# Patient Record
Sex: FEMALE | Race: WHITE | Employment: FULL TIME | URBAN - NONMETROPOLITAN AREA
[De-identification: names, ages, dates, MRNs, and addresses within clinical notes are randomized per-mention and may not be internally consistent; named-entity substitution may affect disease eponyms.]

---

## 2022-03-31 PROBLEM — F33.1 MAJOR DEPRESSIVE DISORDER, RECURRENT EPISODE, MODERATE WITH MIXED FEATURES (HCC): Status: ACTIVE | Noted: 2020-11-24

## 2022-03-31 PROBLEM — R79.89 HIGH SERUM TESTOSTERONE: Status: ACTIVE | Noted: 2018-12-18

## 2023-08-16 DIAGNOSIS — Z01.818 PREOP EXAMINATION: Primary | ICD-10-CM

## 2023-09-21 ENCOUNTER — PREP FOR PROCEDURE (OUTPATIENT)
Dept: BARIATRICS/WEIGHT MGMT | Age: 23
End: 2023-09-21

## 2023-09-21 RX ORDER — SCOLOPAMINE TRANSDERMAL SYSTEM 1 MG/1
1 PATCH, EXTENDED RELEASE TRANSDERMAL
Status: CANCELLED | OUTPATIENT
Start: 2023-10-02 | End: 2023-10-05

## 2023-09-21 RX ORDER — FAMOTIDINE 10 MG/ML
20 INJECTION, SOLUTION INTRAVENOUS ONCE
Status: CANCELLED | OUTPATIENT
Start: 2023-10-02 | End: 2023-10-02

## 2023-09-21 RX ORDER — SODIUM CHLORIDE 9 MG/ML
INJECTION, SOLUTION INTRAVENOUS PRN
Status: CANCELLED | OUTPATIENT
Start: 2023-10-02

## 2023-09-21 RX ORDER — SODIUM CHLORIDE 0.9 % (FLUSH) 0.9 %
5-40 SYRINGE (ML) INJECTION PRN
Status: CANCELLED | OUTPATIENT
Start: 2023-10-02

## 2023-09-21 RX ORDER — ONDANSETRON 2 MG/ML
4 INJECTION INTRAMUSCULAR; INTRAVENOUS ONCE
Status: CANCELLED | OUTPATIENT
Start: 2023-10-02 | End: 2023-10-02

## 2023-09-21 RX ORDER — SODIUM CHLORIDE 0.9 % (FLUSH) 0.9 %
5-40 SYRINGE (ML) INJECTION EVERY 12 HOURS SCHEDULED
Status: CANCELLED | OUTPATIENT
Start: 2023-10-02

## 2023-09-21 RX ORDER — DEXAMETHASONE SODIUM PHOSPHATE 10 MG/ML
10 INJECTION, SOLUTION INTRAMUSCULAR; INTRAVENOUS ONCE
Status: CANCELLED | OUTPATIENT
Start: 2023-10-02 | End: 2023-10-02

## 2023-09-22 ENCOUNTER — TELEMEDICINE (OUTPATIENT)
Dept: PSYCHIATRY | Age: 23
End: 2023-09-22
Payer: COMMERCIAL

## 2023-09-22 DIAGNOSIS — G47.00 INSOMNIA, UNSPECIFIED TYPE: ICD-10-CM

## 2023-09-22 DIAGNOSIS — F41.9 ANXIETY: ICD-10-CM

## 2023-09-22 DIAGNOSIS — F90.2 ATTENTION DEFICIT HYPERACTIVITY DISORDER (ADHD), COMBINED TYPE: Primary | ICD-10-CM

## 2023-09-22 DIAGNOSIS — F43.0 ACUTE STRESS REACTION: ICD-10-CM

## 2023-09-22 PROCEDURE — G8427 DOCREV CUR MEDS BY ELIG CLIN: HCPCS | Performed by: NURSE PRACTITIONER

## 2023-09-22 PROCEDURE — G8417 CALC BMI ABV UP PARAM F/U: HCPCS | Performed by: NURSE PRACTITIONER

## 2023-09-22 PROCEDURE — 1036F TOBACCO NON-USER: CPT | Performed by: NURSE PRACTITIONER

## 2023-09-22 PROCEDURE — 99214 OFFICE O/P EST MOD 30 MIN: CPT | Performed by: NURSE PRACTITIONER

## 2023-09-22 PROCEDURE — 90833 PSYTX W PT W E/M 30 MIN: CPT | Performed by: NURSE PRACTITIONER

## 2023-09-22 RX ORDER — LISDEXAMFETAMINE DIMESYLATE CAPSULES 60 MG/1
60 CAPSULE ORAL DAILY
Qty: 30 CAPSULE | Refills: 0 | Status: SHIPPED | OUTPATIENT
Start: 2023-09-22 | End: 2023-09-26 | Stop reason: SDUPTHER

## 2023-09-22 ASSESSMENT — ANXIETY QUESTIONNAIRES
4. TROUBLE RELAXING: SEVERAL DAYS
IF YOU CHECKED OFF ANY PROBLEMS ON THIS QUESTIONNAIRE, HOW DIFFICULT HAVE THESE PROBLEMS MADE IT FOR YOU TO DO YOUR WORK, TAKE CARE OF THINGS AT HOME, OR GET ALONG WITH OTHER PEOPLE: SOMEWHAT DIFFICULT
6. BECOMING EASILY ANNOYED OR IRRITABLE: 2
2. NOT BEING ABLE TO STOP OR CONTROL WORRYING: NOT AT ALL
3. WORRYING TOO MUCH ABOUT DIFFERENT THINGS: 1
6. BECOMING EASILY ANNOYED OR IRRITABLE: MORE THAN HALF THE DAYS
IF YOU CHECKED OFF ANY PROBLEMS ON THIS QUESTIONNAIRE, HOW DIFFICULT HAVE THESE PROBLEMS MADE IT FOR YOU TO DO YOUR WORK, TAKE CARE OF THINGS AT HOME, OR GET ALONG WITH OTHER PEOPLE: SOMEWHAT DIFFICULT
4. TROUBLE RELAXING: 1
7. FEELING AFRAID AS IF SOMETHING AWFUL MIGHT HAPPEN: SEVERAL DAYS
3. WORRYING TOO MUCH ABOUT DIFFERENT THINGS: SEVERAL DAYS
1. FEELING NERVOUS, ANXIOUS, OR ON EDGE: SEVERAL DAYS
7. FEELING AFRAID AS IF SOMETHING AWFUL MIGHT HAPPEN: 1
5. BEING SO RESTLESS THAT IT IS HARD TO SIT STILL: NOT AT ALL
5. BEING SO RESTLESS THAT IT IS HARD TO SIT STILL: 0
2. NOT BEING ABLE TO STOP OR CONTROL WORRYING: 0
1. FEELING NERVOUS, ANXIOUS, OR ON EDGE: 1
GAD7 TOTAL SCORE: 6

## 2023-09-22 ASSESSMENT — PATIENT HEALTH QUESTIONNAIRE - PHQ9
SUM OF ALL RESPONSES TO PHQ QUESTIONS 1-9: 1
SUM OF ALL RESPONSES TO PHQ9 QUESTIONS 1 & 2: 1
SUM OF ALL RESPONSES TO PHQ QUESTIONS 1-9: 1
SUM OF ALL RESPONSES TO PHQ QUESTIONS 1-9: 1
1. LITTLE INTEREST OR PLEASURE IN DOING THINGS: 0
SUM OF ALL RESPONSES TO PHQ QUESTIONS 1-9: 1
1. LITTLE INTEREST OR PLEASURE IN DOING THINGS: NOT AT ALL
2. FEELING DOWN, DEPRESSED OR HOPELESS: 1
SUM OF ALL RESPONSES TO PHQ9 QUESTIONS 1 & 2: 1
2. FEELING DOWN, DEPRESSED OR HOPELESS: SEVERAL DAYS

## 2023-09-22 NOTE — PROGRESS NOTES
1. Attention deficit hyperactivity disorder (ADHD), combined type    2. Anxiety    3. Insomnia, unspecified type    4. Acute stress reaction        R/O BPD    PLAN   Follow-up:  Return in about 4 weeks (around 10/20/2023), or if symptoms worsen or fail to improve, for follow-up and medication management. Prescriptions for this encounter:  New Prescriptions    No medications on file       Orders Placed This Encounter   Medications    DISCONTD: lisdexamfetamine 60 MG CAPS     Sig: Take 60 mg by mouth daily for 30 days. Max Daily Amount: 60 mg     Dispense:  30 capsule     Refill:  0       Medications Discontinued During This Encounter   Medication Reason    Prenatal MV & Min w/FA-DHA (PRENATAL ADULT GUMMY/DHA/FA PO) Patient Choice    lisdexamfetamine (VYVANSE) 50 MG capsule        Additional orders:  No orders of the defined types were placed in this encounter. Patient is reporting she doesn't find current dose of Vyvanse beneficial. She is still reporting problems with focus/cocnentration and symptoms of ADD/ADHD. She will increase to Vyvanse 60mg daily. She is having significant stressors. Supportive therapy provided. Patient is encouraged to utilize nonpharmacologic coping skills such as deep breathing, guided imagery, guided meditation, muscle relaxation, calming music, and/or journaling. Risks, potential side effects, possible drug-drug interactions, benefits and alternate treatments discussed in detail. All questions answered. Patient stated understanding and is agreeable to treatment plan. Patient has been instructed to seek emergency help via the emergency and/or calling 911 should symptoms become severe, worsen, or with other concerning symptoms. Patient instructed to go immediately to the emergency room and/or call 911 with any suicidal or homicidal ideations or if audio/visual hallucinations develop. Patient stated understanding and agrees. Patient given crisis center information.     I

## 2023-10-02 PROBLEM — E66.01 MORBID OBESITY (HCC): Status: ACTIVE | Noted: 2023-10-02

## 2023-10-02 PROBLEM — E66.01 CLASS 3 SEVERE OBESITY DUE TO EXCESS CALORIES WITH BODY MASS INDEX (BMI) OF 45.0 TO 49.9 IN ADULT, UNSPECIFIED WHETHER SERIOUS COMORBIDITY PRESENT (HCC): Status: ACTIVE | Noted: 2023-10-02

## 2023-10-02 PROBLEM — E11.9 TYPE 2 DIABETES MELLITUS WITHOUT COMPLICATION (HCC): Status: ACTIVE | Noted: 2023-10-02

## 2023-10-02 PROBLEM — E66.813 CLASS 3 SEVERE OBESITY DUE TO EXCESS CALORIES WITH BODY MASS INDEX (BMI) OF 45.0 TO 49.9 IN ADULT, UNSPECIFIED WHETHER SERIOUS COMORBIDITY PRESENT: Status: ACTIVE | Noted: 2023-10-02

## 2023-10-09 ENCOUNTER — OFFICE VISIT (OUTPATIENT)
Dept: BARIATRICS/WEIGHT MGMT | Age: 23
End: 2023-10-09

## 2023-10-09 VITALS
HEART RATE: 84 BPM | HEIGHT: 70 IN | WEIGHT: 242.2 LBS | SYSTOLIC BLOOD PRESSURE: 124 MMHG | DIASTOLIC BLOOD PRESSURE: 70 MMHG | BODY MASS INDEX: 34.67 KG/M2 | TEMPERATURE: 97.9 F

## 2023-10-09 DIAGNOSIS — F32.A DEPRESSION, UNSPECIFIED DEPRESSION TYPE: ICD-10-CM

## 2023-10-09 DIAGNOSIS — G47.33 OSA ON CPAP: ICD-10-CM

## 2023-10-09 DIAGNOSIS — F43.10 PTSD (POST-TRAUMATIC STRESS DISORDER): ICD-10-CM

## 2023-10-09 DIAGNOSIS — F41.1 GENERALIZED ANXIETY DISORDER: ICD-10-CM

## 2023-10-09 DIAGNOSIS — Z98.84 HISTORY OF ROUX-EN-Y GASTRIC BYPASS: Primary | ICD-10-CM

## 2023-10-09 PROCEDURE — 99024 POSTOP FOLLOW-UP VISIT: CPT | Performed by: PHYSICIAN ASSISTANT

## 2023-10-09 NOTE — PATIENT INSTRUCTIONS
Stay well hydrated. Drink a minimum of 64 oz of non-carbonated, non-caffeinated fluids daily. Nutritional education occurred during visit. Tolerating diet. Continue following  with dietitian and follow their recommendations as  directed. Continue  60-80  grams of protein each day. Signs and symptoms reviewed with patient that would be concerning and need her to return to office for re-evaluation. Patient will call if any questions or  concerns arrise. No lifting, pushing, pulling over 20#  No abdominal exercises  Wear abdominal binder prn  Complete Lovenox as rx  Importance of physical activity discussed with patient. Advised to increase activity as tolerated. Continue taking Multivitamin, Calcium and B12 as directed  Continue Omeprazole for at least 3 months post-op. Encouraged to attend support groups  May start soft foods over weekend (starting 10/14/23)  AFTER drinking 2 protein shakes- yogurt, cottage cheese, mashed potatoes, applesauce ( Max of 2 Tablespoons)  Follow-up in 1 week with provider and dietitian  Wear CPAP QHS- adjust pressure with pulmonary as needed.

## 2023-10-12 ENCOUNTER — CARE COORDINATION (OUTPATIENT)
Dept: CASE MANAGEMENT | Age: 23
End: 2023-10-12

## 2023-10-12 NOTE — CARE COORDINATION
Care Transitions Follow Up Call    Patient Current Location:  Home: [address] [facility]    Care Transition Nurse contacted the patient by telephone to follow up after admission on 10/2/23. Verified name and  with patient as identifiers. Patient: [place]  Patient : 2000   MRN: <[id number]>  Reason for Admission: s/p gastric bypass  Discharge Date: 10/3/23 RARS: Readmission Risk Score: 4.4      Needs to be reviewed by the provider   Additional needs identified to be addressed with provider: Yes  none             Method of communication with provider: none. Patient states she is doing pretty good. States she is up and moving around. Patient states she was seen by PA 10/9/23 for a post op appointment. She states per PA incision looks good. Denies redness, drainage, fever, chills, abdominal cramping, n/v. Patient states she is no longer taking pain or nausea medication. Elimination is regular currently. Patient states it had been 5 day since a BM. States she took Magnesium of Citrate 10/6/23. Patient had a question if normal to experience a numbness well below incision and starting her menstrual cycle 2 weeks earlier. Patient informed body is going through changes with weight fluctuation. Patient encouraged to discuss with her OB/Gyn and NP when she returns for a post-op appointment on 10/16. Patient is tolerating a diet of protein shakes, water with Crystal Light, jello and broth. She states she was approved to move forward with soft foods earlier than anticipated. Patient states she has to consult her pulmonologist to adjust readings for her C-PAP because she thinks it is to much pressure. Patient is playing phone tag with [employer]. They called her. She returned the call. She is not waiting for a return call. Patient encouraged to call them back. No other questions or concerns.     Addressed changes since last contact:   Incisional pain status, diet tolerating soft foods, questions

## 2023-10-13 ENCOUNTER — OFFICE VISIT (OUTPATIENT)
Dept: PSYCHOLOGY | Age: 23
End: 2023-10-13
Payer: COMMERCIAL

## 2023-10-13 DIAGNOSIS — F90.2 ATTENTION DEFICIT HYPERACTIVITY DISORDER (ADHD), COMBINED TYPE: Primary | ICD-10-CM

## 2023-10-13 DIAGNOSIS — F84.0 AUTISM: ICD-10-CM

## 2023-10-13 PROCEDURE — 1036F TOBACCO NON-USER: CPT | Performed by: PSYCHOLOGIST

## 2023-10-13 PROCEDURE — 90832 PSYTX W PT 30 MINUTES: CPT | Performed by: PSYCHOLOGIST

## 2023-10-13 NOTE — PROGRESS NOTES
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 MARRIAGES: two. First marriage was March 2018 until it ended in divorce in Oct. 2020. Second marriage was June 2022 until they  9 days later due to her spouse having an affair. They are in divorce proceedings currently; final court hearing was May 31, 2023 and her spouse did not attend so paperwork is still pending. CHILDREN: two (oldest is from her first marriage, second child is from a relationship between the first and second marriage)    SUBSTANCE USE:    1. Marijuana: tried it in high school but did not like how it made her feel. Social Determinants of Health     Financial Resource Strain: Low Risk  (3/31/2022)    Overall Financial Resource Strain (CARDIA)     Difficulty of Paying Living Expenses: Not hard at all   Food Insecurity: No Food Insecurity (3/31/2022)    Hunger Vital Sign     Worried About Running Out of Food in the Last Year: Never true     Ran Out of Food in the Last Year: Never true   Transportation Needs: Not on file   Physical Activity: Not on file   Stress: Not on file   Social Connections: Not on file   Intimate Partner Violence: Not on file   Housing Stability: Not on file       TOBACCO:   reports that she quit smoking about 6 years ago. Her smoking use included cigarettes. She smoked an average of .5 packs per day. She has never used smokeless tobacco.  ETOH:   reports current alcohol use.     Family History:   Family History   Problem Relation Age of Onset    Diabetes Mother     Heart Disease Mother     Other Mother     High Blood Pressure Mother     Depression Mother     Arthritis Mother     Obesity Mother     Hypertension Mother     Arthritis Father     Hypertension Father     Mental Illness Father     ADHD Sister     Autism Sister     Depression Brother     ADHD Brother     Obesity Brother     Hypertension Brother

## 2023-10-16 ENCOUNTER — OFFICE VISIT (OUTPATIENT)
Dept: BARIATRICS/WEIGHT MGMT | Age: 23
End: 2023-10-16

## 2023-10-16 ENCOUNTER — TELEPHONE (OUTPATIENT)
Dept: PULMONOLOGY | Age: 23
End: 2023-10-16

## 2023-10-16 VITALS
TEMPERATURE: 97.9 F | HEART RATE: 68 BPM | SYSTOLIC BLOOD PRESSURE: 108 MMHG | HEIGHT: 70 IN | WEIGHT: 234.8 LBS | DIASTOLIC BLOOD PRESSURE: 66 MMHG | BODY MASS INDEX: 33.61 KG/M2

## 2023-10-16 DIAGNOSIS — F32.A DEPRESSION, UNSPECIFIED DEPRESSION TYPE: ICD-10-CM

## 2023-10-16 DIAGNOSIS — K91.2 POSTSURGICAL MALABSORPTION: ICD-10-CM

## 2023-10-16 DIAGNOSIS — Z98.84 STATUS POST BARIATRIC SURGERY: Primary | ICD-10-CM

## 2023-10-16 DIAGNOSIS — Z98.84 HISTORY OF ROUX-EN-Y GASTRIC BYPASS: Primary | ICD-10-CM

## 2023-10-16 DIAGNOSIS — G47.33 OSA ON CPAP: ICD-10-CM

## 2023-10-16 DIAGNOSIS — Z13.21 SCREENING FOR MALNUTRITION: ICD-10-CM

## 2023-10-16 DIAGNOSIS — F41.1 GENERALIZED ANXIETY DISORDER: ICD-10-CM

## 2023-10-16 PROCEDURE — 99024 POSTOP FOLLOW-UP VISIT: CPT | Performed by: PHYSICIAN ASSISTANT

## 2023-10-16 RX ORDER — AMOXICILLIN 250 MG
CAPSULE ORAL
COMMUNITY

## 2023-10-16 NOTE — TELEPHONE ENCOUNTER
Patient called and left a voicemail requesting appt. Patient had gastric bypass surgery and thinks her pressure might be set up to high. Called and advised patient she is scheduled for 10/27/23.

## 2023-10-16 NOTE — PATIENT INSTRUCTIONS
Stay well hydrated. Drink a minimum of 64 oz of non-carbonated, non-caffeinated fluids daily. Nutritional education occurred during visit. Tolerating diet. Continue following with dietitian and follow their recommendations as directed. Continue  60-80  grams of protein each day. Signs and symptoms reviewed with patient that would be concerning and need her to return to office for re-evaluation. Patient will call if any questions or concerns arrise. No lifting, pushing, pulling over 20#  No abdominal exercises  Wear abdominal binder prn  Complete Lovenox as rx  Importance of physical activity discussed with patient. Increase physical activity as tolerated  Continue taking Multivitamin, Calcium and B12 as directed  Continue Omeprazole for at least 3 months post-op. Encouraged to attend support groups  Progress diet as tolerated per dietitian recommendations. 6 week labs ordered- to be drawn one week prior to next apt.   Adjust CPAP pressure with pulmonary as scheduled 10/27/23  Add Miaralax as needed

## 2023-10-18 ENCOUNTER — TELEPHONE (OUTPATIENT)
Dept: BARIATRICS/WEIGHT MGMT | Age: 23
End: 2023-10-18

## 2023-10-18 ENCOUNTER — TELEMEDICINE (OUTPATIENT)
Dept: PSYCHIATRY | Age: 23
End: 2023-10-18
Payer: COMMERCIAL

## 2023-10-18 DIAGNOSIS — F90.2 ATTENTION DEFICIT HYPERACTIVITY DISORDER (ADHD), COMBINED TYPE: Primary | ICD-10-CM

## 2023-10-18 DIAGNOSIS — F41.9 ANXIETY: ICD-10-CM

## 2023-10-18 DIAGNOSIS — F84.0 AUTISM: ICD-10-CM

## 2023-10-18 DIAGNOSIS — G47.00 INSOMNIA, UNSPECIFIED TYPE: ICD-10-CM

## 2023-10-18 PROCEDURE — G8427 DOCREV CUR MEDS BY ELIG CLIN: HCPCS | Performed by: NURSE PRACTITIONER

## 2023-10-18 PROCEDURE — 90833 PSYTX W PT W E/M 30 MIN: CPT | Performed by: NURSE PRACTITIONER

## 2023-10-18 PROCEDURE — 1036F TOBACCO NON-USER: CPT | Performed by: NURSE PRACTITIONER

## 2023-10-18 PROCEDURE — 99214 OFFICE O/P EST MOD 30 MIN: CPT | Performed by: NURSE PRACTITIONER

## 2023-10-18 PROCEDURE — G8417 CALC BMI ABV UP PARAM F/U: HCPCS | Performed by: NURSE PRACTITIONER

## 2023-10-18 PROCEDURE — G8484 FLU IMMUNIZE NO ADMIN: HCPCS | Performed by: NURSE PRACTITIONER

## 2023-10-18 PROCEDURE — 1111F DSCHRG MED/CURRENT MED MERGE: CPT | Performed by: NURSE PRACTITIONER

## 2023-10-18 RX ORDER — POLYETHYLENE GLYCOL 3350 17 G/17G
17 POWDER, FOR SOLUTION ORAL DAILY PRN
COMMUNITY

## 2023-10-18 RX ORDER — LISDEXAMFETAMINE DIMESYLATE CAPSULES 50 MG/1
50 CAPSULE ORAL DAILY
Qty: 30 CAPSULE | Refills: 0 | Status: SHIPPED | OUTPATIENT
Start: 2023-10-18 | End: 2023-11-17

## 2023-10-18 RX ORDER — LISDEXAMFETAMINE DIMESYLATE CAPSULES 50 MG/1
50 CAPSULE ORAL DAILY
Qty: 30 CAPSULE | Refills: 0 | Status: SHIPPED | OUTPATIENT
Start: 2023-12-17 | End: 2024-01-16

## 2023-10-18 RX ORDER — LISDEXAMFETAMINE DIMESYLATE CAPSULES 50 MG/1
50 CAPSULE ORAL DAILY
Qty: 30 CAPSULE | Refills: 0 | Status: SHIPPED | OUTPATIENT
Start: 2023-11-17 | End: 2023-12-17

## 2023-10-18 ASSESSMENT — ANXIETY QUESTIONNAIRES
5. BEING SO RESTLESS THAT IT IS HARD TO SIT STILL: SEVERAL DAYS
3. WORRYING TOO MUCH ABOUT DIFFERENT THINGS: 0
4. TROUBLE RELAXING: 1
1. FEELING NERVOUS, ANXIOUS, OR ON EDGE: NOT AT ALL
7. FEELING AFRAID AS IF SOMETHING AWFUL MIGHT HAPPEN: 0
5. BEING SO RESTLESS THAT IT IS HARD TO SIT STILL: 1
6. BECOMING EASILY ANNOYED OR IRRITABLE: 1
6. BECOMING EASILY ANNOYED OR IRRITABLE: SEVERAL DAYS
IF YOU CHECKED OFF ANY PROBLEMS ON THIS QUESTIONNAIRE, HOW DIFFICULT HAVE THESE PROBLEMS MADE IT FOR YOU TO DO YOUR WORK, TAKE CARE OF THINGS AT HOME, OR GET ALONG WITH OTHER PEOPLE: NOT DIFFICULT AT ALL
7. FEELING AFRAID AS IF SOMETHING AWFUL MIGHT HAPPEN: NOT AT ALL
4. TROUBLE RELAXING: SEVERAL DAYS
IF YOU CHECKED OFF ANY PROBLEMS ON THIS QUESTIONNAIRE, HOW DIFFICULT HAVE THESE PROBLEMS MADE IT FOR YOU TO DO YOUR WORK, TAKE CARE OF THINGS AT HOME, OR GET ALONG WITH OTHER PEOPLE: NOT DIFFICULT AT ALL
3. WORRYING TOO MUCH ABOUT DIFFERENT THINGS: NOT AT ALL
1. FEELING NERVOUS, ANXIOUS, OR ON EDGE: 0
2. NOT BEING ABLE TO STOP OR CONTROL WORRYING: NOT AT ALL
GAD7 TOTAL SCORE: 3
2. NOT BEING ABLE TO STOP OR CONTROL WORRYING: 0

## 2023-10-18 ASSESSMENT — PATIENT HEALTH QUESTIONNAIRE - PHQ9
SUM OF ALL RESPONSES TO PHQ9 QUESTIONS 1 & 2: 0
SUM OF ALL RESPONSES TO PHQ QUESTIONS 1-9: 0
SUM OF ALL RESPONSES TO PHQ QUESTIONS 1-9: 0
1. LITTLE INTEREST OR PLEASURE IN DOING THINGS: NOT AT ALL
1. LITTLE INTEREST OR PLEASURE IN DOING THINGS: 0
SUM OF ALL RESPONSES TO PHQ QUESTIONS 1-9: 0
2. FEELING DOWN, DEPRESSED OR HOPELESS: NOT AT ALL
SUM OF ALL RESPONSES TO PHQ9 QUESTIONS 1 & 2: 0
SUM OF ALL RESPONSES TO PHQ QUESTIONS 1-9: 0
2. FEELING DOWN, DEPRESSED OR HOPELESS: 0

## 2023-10-18 NOTE — PROGRESS NOTES
fluid and protein intake    Sleeping well overall    No mood swings    States she is \"fine\" with her grief and emotions related to her brother's death    Denies suicidal ideations, intent, plan. No homicidal ideations, intent, plan. No audiovisual hallucinations. She last filled Vyvanse on 9/28/2023. She recently changed her name but her 's license is still with her former last name Yasemin Omer). Denies suicidal ideations, intent, plan. No homicidal ideations, intent, plan. No audiovisual hallucinations. HPI      PSYCHIATRIC HISTORY:  Patient has had prior care with the following:    [x] Psychiatrist ([facility] [place], saw pediatric psychiatrist)    [x] Psychologist (Dr. Jo Gore)    [x] Other Therapist (as a child)    [] None    The patient has had 3 lifetime suicide attempts. Methods used for the suicide attempts include attempted strangling with scarves. States \"It was more like a cry for help rather than actually trying to do anything\". The patient's most recent suicide attempt was when she was in high school. Patient reports 1 psych hospital admissions with the last admission taking place when she was in 5th. States this was more due to a medication evaluation. Past psychiatric medications include: Wellbutrin XL (post-partum);  Abilify, Strattera, Lithium, Topamax (caused suicidal ideations)- all of these as a child    Adverse reactions from psychotropic medications:  As above      Current Psychiatric Review of Systems         Brittny or Hypomania:  no     Panic Attacks:  no     Phobias:  no     Obsessions and Compulsions:  no     Body or Vocal Tics:  no     Hallucinations:  no     Delusions:  no    SOCIAL HISTORY:  Patient was born in  [place], [place]  and raised by her  maternal grandparents from about age 9 - visited mom on the weekends      Social History     Socioeconomic History    Marital status:      Spouse name: Not on file    Number of children: 2    Years of education: Not on file

## 2023-10-18 NOTE — TELEPHONE ENCOUNTER
Naveen Hurley calls stating her BP has low -she passed out and went to the ED- has called her PCP twice and they won't return her call-she voiced frustration. Hayley Frank did prescribe her VyVanse and she did decrease this dose today at Marybeth's visit. Mariam Mcgowan noted that Arcelia's lipase was elevated -115 from ED lab draw- said this may be affecting her BP and she should follow up with our office. I will send this message to [facility] and have her call -pt voiced understanding.

## 2023-10-19 ENCOUNTER — CARE COORDINATION (OUTPATIENT)
Dept: CASE MANAGEMENT | Age: 23
End: 2023-10-19

## 2023-10-19 NOTE — CARE COORDINATION
Care Transitions Follow Up Call    Patient Current Location:  Home: [address] [facility]    Care Transition Nurse contacted the patient by telephone to follow up after admission on 10/2/23. Verified name and  with patient as identifiers. Patient: Syed Smart  Patient : 2000   MRN: <[id number]>  Reason for Admission:  s/p gastric bypass  Discharge Date: 10/3/23 RARS: Readmission Risk Score: 4.4      Needs to be reviewed by the provider   Additional needs identified to be addressed with provider: No  none             Method of communication with provider: none. Contacted pt for care transition follow up. Florencio Andrade states she is doing pretty good as far as the procedure. Continues protein shakes and her fluids. Had issues with moving her bowels but now moving them every other day. Reports she went to [facility] ED on 10/17/23 because she passed out at her mom's. Reports it was probably a combination of low BP and her menstrual cycle. Pt removed the birth control and had heavy bleeding. Feeling much better now. BP running 118/64. She does admit to lightheadedness when transitioning. She is careful and avoiding any quick or sudden movements. Denies having any nausea/vomiting, abdominal pain. She has contacted PCP office several times but has not heard back. Pt is planning to change providers. As of right now, she plans to look for a provider in the [facility] but will see if she can find one. No other questions or concerns. Addressed changes since last contact:   Went to [facility] ED on 10/17/23 for nausea, weakness.      Follow Up  Future Appointments   Date Time Provider [address] [place] [address]   10/20/2023  1:15 PM Boom Hammond MD AFL A[facility]X AFL [facility] END   10/27/2023 10:00 AM ONESIMO Kramer - CNP [facility] [place]   11/15/2023 10:00 AM RAQUEL Guillen N SRPX WT MG [facility] - [facility] [place]   11/15/2023 10:30 AM Joyce Mcfarland, MICHAEL, LD N SRPX WT MG [facility] - [place] [place]

## 2023-10-23 ENCOUNTER — TELEPHONE (OUTPATIENT)
Dept: BARIATRICS/WEIGHT MGMT | Age: 23
End: 2023-10-23

## 2023-10-23 NOTE — TELEPHONE ENCOUNTER
Patient called needing Return to Work Letter. Advised pt this will be signed by provider on 10/24/23. Return to work letter will be for 10/30/23 and pt voiced understanding.

## 2023-10-24 ENCOUNTER — TELEPHONE (OUTPATIENT)
Dept: PSYCHIATRY | Age: 23
End: 2023-10-24

## 2023-10-24 ENCOUNTER — CARE COORDINATION (OUTPATIENT)
Dept: CASE MANAGEMENT | Age: 23
End: 2023-10-24

## 2023-10-24 NOTE — TELEPHONE ENCOUNTER
Patient called to request an increase in Vyvanse. Reports it was discussed at last appt that an additional 10 mg Vyvanse could be prescribed to help with ADHD symptoms. Reports being irritable and unable to focus. States she was taking 60 mg Vyvanse prior to decreasing to 50 mg. She states she was decreased due to shakiness post bariatric surgery but feels like she needs to go back up on her dose. Please advise.        Last visit- 10/18/2023  Next visit- 12/7/2023

## 2023-10-24 NOTE — CARE COORDINATION
[address] PSYCH [facility] [place]   12/22/2023 12:00 PM Chalo Munoz, [address]   1/23/2024 11:30 AM Maldonado Candelario MD AFL APEX AFL APEX END   2/23/2024 10:00 AM Spike Ludwig, PhD N SRPXPsychl [facility]   3/8/2024 10:00 AM Spike Ludwig, PhD N SRPXPsychl [address]     External follow up appointment(s):     Care Transition Nurse reviewed discharge instructions, medical action plan, and red flags with patient and discussed any barriers to care and/or understanding of plan of care after discharge. Discussed appropriate site of care based on symptoms and resources available to patient including: PCP  Specialist  Urgent care clinics  When to call Marley Gordon. The patient agrees to contact the PCP office for questions related to their healthcare. Advance Care Planning:   not on file. Patients top risk factors for readmission: gastric bypass  HTN, Obesity, PCOS  Interventions to address risk factors: Scheduled appointment with Specialist-10/27/23 PULM    Offered patient enrollment in the Remote Patient Monitoring (RPM) program for in-home monitoring: Patient is not eligible for RPM program.     Care Transitions Subsequent and Final Call    Schedule Follow Up Appointment with PCP: Completed  Subsequent and Final Calls  Do you have any ongoing symptoms?: No  Have your medications changed?: No  Do you have any questions related to your medications?: No  Do you currently have any active services?: No  Do you have any needs or concerns that I can assist you with?: No  Identified Barriers: Lack of Education  Care Transitions Interventions     Transportation Support: Declined               Disease Specific Clinic: Completed            Other Interventions:             Care Transition Nurse provided contact information for future needs. Plan for follow-up call in 5-7 days based on severity of symptoms and risk factors.   Plan for next call: symptom management-pain, n/v/d,

## 2023-10-26 NOTE — PROGRESS NOTES
and clinical improvement  -Recommend 7-9 hours of sleep with PAP  -Instructed to call DME company regarding supplies if needed.   -Patient to call my office for earlier appointment if needed for worsening of sleep symptoms.   -Encouraged further weight loss  -Educated about my impression and plan. Patient verbalizes understanding. Will see Hattie Lambertrdoff back after HST     Information added by my medical assistant/LPN was reviewed today.     Electronically signed by ONESIMO Amaya CNP on 10/27/2023 at 12:10 PM

## 2023-10-27 ENCOUNTER — OFFICE VISIT (OUTPATIENT)
Dept: PULMONOLOGY | Age: 23
End: 2023-10-27
Payer: COMMERCIAL

## 2023-10-27 VITALS
DIASTOLIC BLOOD PRESSURE: 76 MMHG | BODY MASS INDEX: 32.44 KG/M2 | OXYGEN SATURATION: 98 % | WEIGHT: 226.6 LBS | SYSTOLIC BLOOD PRESSURE: 124 MMHG | HEART RATE: 88 BPM | TEMPERATURE: 98.2 F | HEIGHT: 70 IN

## 2023-10-27 DIAGNOSIS — E66.9 OBESITY (BMI 30.0-34.9): ICD-10-CM

## 2023-10-27 DIAGNOSIS — G47.33 OBSTRUCTIVE SLEEP APNEA: Primary | ICD-10-CM

## 2023-10-27 PROCEDURE — G8484 FLU IMMUNIZE NO ADMIN: HCPCS

## 2023-10-27 PROCEDURE — G8427 DOCREV CUR MEDS BY ELIG CLIN: HCPCS

## 2023-10-27 PROCEDURE — 1111F DSCHRG MED/CURRENT MED MERGE: CPT

## 2023-10-27 PROCEDURE — 99214 OFFICE O/P EST MOD 30 MIN: CPT

## 2023-10-27 PROCEDURE — G8417 CALC BMI ABV UP PARAM F/U: HCPCS

## 2023-10-27 PROCEDURE — 1036F TOBACCO NON-USER: CPT

## 2023-10-27 ASSESSMENT — ENCOUNTER SYMPTOMS
SINUS PRESSURE: 0
WHEEZING: 0
COUGH: 0
RHINORRHEA: 0
SINUS PAIN: 0
SHORTNESS OF BREATH: 0
SORE THROAT: 0

## 2023-10-27 NOTE — TELEPHONE ENCOUNTER
Patient reports shakiness stopped almost immediately after reducing Vyvanse to 50 mg. She also mentions when she experienced the shaking, it was for the first four hours after taking the 60 mg dose. Reports she didn't experience all day shaking. Patient has not experienced any shaking at all since dose was reduced.

## 2023-10-31 ENCOUNTER — CARE COORDINATION (OUTPATIENT)
Dept: CASE MANAGEMENT | Age: 23
End: 2023-10-31

## 2023-10-31 NOTE — CARE COORDINATION
Care Transitions Follow Up Call:Final Call    Patient Current Location:  Home: [address] [place] [address]    Care Transition Nurse contacted the patient by telephone to follow up after admission on 10/2/23. Verified name and  with patient as identifiers. Patient: Bart Santiago  Patient : 2000   MRN: [id number]  Reason for Admission: gastric bypass  Discharge Date: 10/3/23 RARS: Readmission Risk Score: 4.4      Needs to be reviewed by the provider   Additional needs identified to be addressed with provider: No  none             Method of communication with provider: none. CTN call to [address] today and she says she is feeling pretty good. Denies n/v/d, fever, chills, sob, chest/abd pain, swelling, dizziness, syncope. C/o lightheadedness when standing @ times if she moves too fast.  HU=873/76  Pulm f/u-> Home sleep study 11/10/23 to see if she still requires CPAP. Pulm f/u 23  Wt.=224 following wt. Management protocol. F/u 11/15/23 w/ labs prior. Psych VV-> decreased Vyvanse d/t wt. Loss. Eating, drinking & sleeping ok. Denies problems w/ urination/bowels. No other concerns voiced at this time. CTN informed her of final call. She expressed appreciation for the care. Addressed changes since last contact:  Vyvanse decreased per Psych  Discussed follow-up appointments. If no appointment was previously scheduled, appointment scheduling offered: Yes. Is follow up appointment scheduled within 7 days of discharge?  No.    Follow Up  Future Appointments   Date Time Provider [address]   11/10/2023 10:30 AM SCHEDULE, STR SLEEP TECH  [facility]   11/15/2023 10:00 AM RAQUEL Mercado N SRPX WT MG P - [place]   11/15/2023 10:30 AM Belkys Bee RD, LD N SRPX WT MG P - [place]   2023  1:00 PM Marielle Lott APRN - CNP [facility] [place]   2023  3:30 PM Gilberto Merida APRN - CNP [facility] - [place]   2023 12:00 PM Larisa Licea,

## 2023-10-31 NOTE — TELEPHONE ENCOUNTER
Staff spoke with patient, she reports she is still experiencing irritability/difficulty concentrating. Denies any new additional symptoms. She would like to proceed with Rx for Vyvanse 50 mg along with Vyvanse 10 mg.

## 2023-11-06 DIAGNOSIS — F90.2 ATTENTION DEFICIT HYPERACTIVITY DISORDER (ADHD), COMBINED TYPE: Primary | ICD-10-CM

## 2023-11-06 RX ORDER — LISDEXAMFETAMINE DIMESYLATE CAPSULES 10 MG/1
10 CAPSULE ORAL DAILY
Qty: 30 CAPSULE | Refills: 0 | Status: SHIPPED | OUTPATIENT
Start: 2023-11-06 | End: 2023-12-06

## 2023-11-06 NOTE — TELEPHONE ENCOUNTER
Can trial split dose of Vyvanse 10mg in addition to the Vyvanse 50mg. However, if patient has any shakiness or dizziness or other symptoms she will need to stop the Vyvanse 10mg immediately. Will send Rx to pharmacy.

## 2023-11-10 ENCOUNTER — HOSPITAL ENCOUNTER (OUTPATIENT)
Dept: SLEEP CENTER | Age: 23
End: 2023-11-10
Payer: COMMERCIAL

## 2023-11-10 DIAGNOSIS — E66.9 OBESITY (BMI 30.0-34.9): ICD-10-CM

## 2023-11-10 DIAGNOSIS — G47.33 OBSTRUCTIVE SLEEP APNEA: ICD-10-CM

## 2023-11-10 PROCEDURE — 95806 SLEEP STUDY UNATT&RESP EFFT: CPT

## 2023-11-10 NOTE — PROGRESS NOTES
Aiden Rosado presents today for a HST instruction and demonstration on an HST. Questions were asked and answers given. She was able to return demonstration and verbalized understanding. The sleep center control room phone number was provided in case questions arise during the study. Informed patient to call 911 in case of an emergency. She states she will return the unit tomorrow before 1000. Title:  Home Sleep Apnea Testing (HSAT)     Approved by:  Laina Richards MD        Approval Date: December, 2021  Next Review: December, 2023       Responsible Party:  Akiko Sharma  Institution/Entities Applies to:    [address] Number:  None      Document Type:  Such as Guideline, Policy,   Policy & Procedure, or Procedure, Instructions   Manual:  Policy and Procedures     Section: IV  Policy Start Date: June, 2011       HOME SLEEP APNEA TESTING (HSAT)      PURPOSE: To ensure home sleep apnea testing (HSAT) conducted by the sleep facility adheres to the current AASM practice parameters, clinical practice guidelines, best practice and clinical guidelines in regard to the diagnosis of CB in adults. POLICY:      HSAT is a method of recording certain parameters which will target and measure, minimally, heart rate, oxygen saturation, respiratory airflow, respiratory effort and snoring for the purpose of evaluating a patient for CB. HSAT will be performed in conjunction with a comprehensive sleep evaluation by an appropriately licensed sleep facility medical staff member. All portable monitoring equipment will be FDA-approved and appropriately maintained to ensure patient safety and efficiency of the test.       PROCEDURE:      An order from a licensed sleep physician along with appropriate medical history documenting the indication for HSAT that complies with the AASM practice parameters.     All tests will be performed, and records will be

## 2023-11-13 LAB — STATUS: NORMAL

## 2023-11-15 ENCOUNTER — OFFICE VISIT (OUTPATIENT)
Dept: BARIATRICS/WEIGHT MGMT | Age: 23
End: 2023-11-15

## 2023-11-15 VITALS
SYSTOLIC BLOOD PRESSURE: 108 MMHG | BODY MASS INDEX: 30.61 KG/M2 | DIASTOLIC BLOOD PRESSURE: 72 MMHG | WEIGHT: 213.8 LBS | TEMPERATURE: 97.7 F | HEIGHT: 70 IN | HEART RATE: 64 BPM

## 2023-11-15 DIAGNOSIS — F32.A DEPRESSION, UNSPECIFIED DEPRESSION TYPE: ICD-10-CM

## 2023-11-15 DIAGNOSIS — K91.2 POSTSURGICAL MALABSORPTION: ICD-10-CM

## 2023-11-15 DIAGNOSIS — Z98.84 STATUS POST BARIATRIC SURGERY: Primary | ICD-10-CM

## 2023-11-15 DIAGNOSIS — F41.1 GENERALIZED ANXIETY DISORDER: ICD-10-CM

## 2023-11-15 DIAGNOSIS — Z98.84 HISTORY OF ROUX-EN-Y GASTRIC BYPASS: Primary | ICD-10-CM

## 2023-11-15 DIAGNOSIS — Z13.21 SCREENING FOR MALNUTRITION: ICD-10-CM

## 2023-11-15 PROCEDURE — 99024 POSTOP FOLLOW-UP VISIT: CPT | Performed by: PHYSICIAN ASSISTANT

## 2023-11-15 NOTE — PATIENT INSTRUCTIONS
1. Continue bariatric vitamins as you are currently taking. Can switch to Celebrate One 45 capsule form Multivitamin in morning. Switch to [facility] Anita 600 mg Calcium tablet with lunch and one tablet with dinner. Need to take with food for absorption  2. Goal remains  60-80 grams protein per day. Focus on choosing protein foods first at meals. Suggest continue one-two protein shakes daily to meet this recommendation. 3. Increase physical activity to include cardiac and strength training now that you no longer have weight restrictions  4. Water goal is 64 oz per day and make sure no liquids 30 minutes before meals and no liquids 30 minutes after meals  5. Take 20-30 minutes to eat meals and chew all foods well for best tolerance especially as you introduce new foods. get lab work done 5-7 days before next office visit.

## 2023-11-29 ENCOUNTER — OFFICE VISIT (OUTPATIENT)
Dept: PULMONOLOGY | Age: 23
End: 2023-11-29
Payer: COMMERCIAL

## 2023-11-29 VITALS
WEIGHT: 212.8 LBS | HEIGHT: 70 IN | SYSTOLIC BLOOD PRESSURE: 128 MMHG | HEART RATE: 97 BPM | BODY MASS INDEX: 30.46 KG/M2 | TEMPERATURE: 97.9 F | OXYGEN SATURATION: 98 % | DIASTOLIC BLOOD PRESSURE: 76 MMHG

## 2023-11-29 DIAGNOSIS — F51.04 PSYCHOPHYSIOLOGICAL INSOMNIA: ICD-10-CM

## 2023-11-29 DIAGNOSIS — E66.9 OBESITY (BMI 30.0-34.9): ICD-10-CM

## 2023-11-29 DIAGNOSIS — G47.33 OBSTRUCTIVE SLEEP APNEA: Primary | ICD-10-CM

## 2023-11-29 PROCEDURE — 99213 OFFICE O/P EST LOW 20 MIN: CPT

## 2023-11-29 PROCEDURE — G8417 CALC BMI ABV UP PARAM F/U: HCPCS

## 2023-11-29 PROCEDURE — 1036F TOBACCO NON-USER: CPT

## 2023-11-29 PROCEDURE — G8484 FLU IMMUNIZE NO ADMIN: HCPCS

## 2023-11-29 PROCEDURE — G8427 DOCREV CUR MEDS BY ELIG CLIN: HCPCS

## 2023-11-29 ASSESSMENT — ENCOUNTER SYMPTOMS
RESPIRATORY NEGATIVE: 1
ALLERGIC/IMMUNOLOGIC NEGATIVE: 1

## 2023-11-29 NOTE — PATIENT INSTRUCTIONS
https://[employer]. [employer]. [employer]/collections/online-programs/products/go-to-sleep-online  See above website for [employer] for cognitive behavioral therapy.  This is an online program available for $40.

## 2023-12-29 LAB
ALBUMIN SERPL-MCNC: 3.9 G/DL (ref 3.5–5)
ALBUMIN/GLOBULIN RATIO: 1.3 (ref 1.2–1.5)
ALK PHOSPHATASE: 66 U/L (ref 38–126)
ALT SERPL-CCNC: 23 U/L (ref 7–35)
AST SERPL-CCNC: 17 U/L (ref 10–42)
BASOPHILS # BLD: 0.8 % (ref 0–3)
BILIRUB SERPL-MCNC: 0.5 MG/DL (ref 0.3–1.2)
BUN BLDV-MCNC: 16 MG/DL (ref 7–22)
CALCIUM SERPL-MCNC: 9.3 MG/DL (ref 8.4–10.2)
CHLORIDE BLD-SCNC: 104 MEQ/L (ref 98–107)
CO2: 23 MEQ/L (ref 22–31)
CREAT SERPL-MCNC: 0.9 MG/DL (ref 0.4–1.1)
EOSINOPHIL # BLD: 1.5 % (ref 0–4)
GFR SERPL CREATININE-BSD FRML MDRD: >=60 ML/MIN/{1.73_M2}
GLOBULIN: 3.1 G/DL (ref 2.9–3.3)
GLUCOSE: 93 MG/DL (ref 70–126)
HCT VFR BLD CALC: 38.1 % (ref 37–47)
HEMOGLOBIN: 13.4 G/DL (ref 12–16)
LYMPHOCYTES # BLD: 35.1 % (ref 17.6–49.6)
MCH RBC QN AUTO: 29.4 PG (ref 28–32)
MCHC RBC AUTO-ENTMCNC: 35.2 G/DL (ref 33–37)
MCV RBC AUTO: 83.6 FL (ref 81–99)
MONOCYTES # BLD: 5.8 % (ref 4.1–12.4)
PDW BLD-RTO: 12.9 % (ref 11.5–14.5)
PLATELET # BLD: 259 THOU/CUMM (ref 130–400)
POTASSIUM SERPL-SCNC: 3.8 MEQ/L (ref 3.5–5.1)
PREALBUMIN: 25.7 MG/DL (ref 20–40)
RBC: 4.56 MIL/CUMM (ref 4.2–5.4)
SCAN OF BLOOD SMEAR: NO
SEG NEUTROPHILS: 56.8 % (ref 39.4–72.5)
SODIUM BLD-SCNC: 136 MEQ/L (ref 136–145)
TOTAL PROTEIN: 7 G/DL (ref 6.4–8.3)
VITAMIN D 25-HYDROXY: 33 (ref 30–100)
WBC: 6 THOU/CUMM (ref 4.8–10.8)

## 2024-01-02 LAB — VITAMIN B1 WHOLE BLOOD: 93 NMOL/L (ref 70–180)

## 2024-01-02 NOTE — PROGRESS NOTES
Patient is a 23 y.o. female seen for follow up MNT visit for three month post op.     Vitals from current and previous visits:      1/4/2024  9:05 AM   Vitals    SYSTOLIC 114    DIASTOLIC 64    Site Right Upper Arm    Position Sitting    Cuff Size Large Adult    Pulse 68    Temp 98.1 °F (36.7 °C)    Respirations    SpO2    Weight - Scale 193 lb 3.2 oz    Height 5' 10.25\"    Body Mass Index 27.52 kg/m2 (H)            Recent Post Op Lab Work:  Three month labs- B1-93, Vit D-33, glucose-93  Lab Results   Component Value Date/Time    VITD25 33 12/29/2023 09:09 AM       Date of Surgery: 10/2/23  Type of Surgery: gastric bypass     Initial Weight: 257 lbs at pre-op teaching class   Excess  Body Weight Pre-Op: 83  lbs     Weight Loss: 64 lbs.  Patient's Target Weight =  174 lbs for a BMI of ~25  Percentage of Excess Body Weight Lost to date is :  77%    How pleased are you with your current weight loss:   Knee and hips ache some.  Not as tired      Are you experiencing any physical problems post surgery: No    Are you experiencing any dietary problems post surgery: No  Food Intolerances: Denies food intolerances.  States doesn't tolerate bread well and avoids it    How frequently are you eating? 3 times a day eating food  How long does it take you to finish a meal? Learning to slow down when eats- at work thinks may eat too fast.  Has dumping symptoms after eating at work  How full do you feel after eating? Feels full after  eats    Reports tolerating fruits and vegetables well.  Has not tried salads yet.  Has tried cashews, sunflower seeds    Protein intake: 60-80 grams protein daily  Patient taking protein supplement:  Fairlife Milk 1-2 cups per day.  Using Unflavored Isopure protein powder once a day = 25 grams protein per scoop- may mix this with Crystal Lite      Fluid intake: Drinking plain water ~ using Life Water bottles at work.  States not tracking water but is certain drinking > 64 oz water a

## 2024-01-04 ENCOUNTER — OFFICE VISIT (OUTPATIENT)
Dept: BARIATRICS/WEIGHT MGMT | Age: 24
End: 2024-01-04

## 2024-01-04 ENCOUNTER — OFFICE VISIT (OUTPATIENT)
Dept: BARIATRICS/WEIGHT MGMT | Age: 24
End: 2024-01-04
Payer: COMMERCIAL

## 2024-01-04 VITALS
HEART RATE: 68 BPM | HEIGHT: 70 IN | SYSTOLIC BLOOD PRESSURE: 114 MMHG | TEMPERATURE: 98.1 F | DIASTOLIC BLOOD PRESSURE: 64 MMHG | BODY MASS INDEX: 27.66 KG/M2 | WEIGHT: 193.2 LBS

## 2024-01-04 DIAGNOSIS — Z98.84 HISTORY OF ROUX-EN-Y GASTRIC BYPASS: Primary | ICD-10-CM

## 2024-01-04 DIAGNOSIS — K91.2 POSTSURGICAL MALABSORPTION: ICD-10-CM

## 2024-01-04 DIAGNOSIS — Z13.21 SCREENING FOR MALNUTRITION: ICD-10-CM

## 2024-01-04 DIAGNOSIS — Z98.84 STATUS POST BARIATRIC SURGERY: Primary | ICD-10-CM

## 2024-01-04 PROBLEM — E66.01 CLASS 3 SEVERE OBESITY DUE TO EXCESS CALORIES WITH BODY MASS INDEX (BMI) OF 45.0 TO 49.9 IN ADULT, UNSPECIFIED WHETHER SERIOUS COMORBIDITY PRESENT (HCC): Status: RESOLVED | Noted: 2023-10-02 | Resolved: 2024-01-04

## 2024-01-04 PROBLEM — E66.813 CLASS 3 SEVERE OBESITY DUE TO EXCESS CALORIES WITH BODY MASS INDEX (BMI) OF 45.0 TO 49.9 IN ADULT, UNSPECIFIED WHETHER SERIOUS COMORBIDITY PRESENT: Status: RESOLVED | Noted: 2023-10-02 | Resolved: 2024-01-04

## 2024-01-04 PROCEDURE — 99213 OFFICE O/P EST LOW 20 MIN: CPT | Performed by: PHYSICIAN ASSISTANT

## 2024-01-04 RX ORDER — PHENOL 1.4 %
1 AEROSOL, SPRAY (ML) MUCOUS MEMBRANE DAILY
COMMUNITY

## 2024-01-04 NOTE — PROGRESS NOTES
[facility]  [address], [address]  [place] [address]  [phone]      Visit Date:  1/4/2024  Weight Management Postop Follow-up    HPI:      Marybeth Escalante is a 23 y.o. female who is here today for 3 month follow up since RYGB performed by Dr. Ellington  on 10/2/23. Continues to do well overall. Feeling good. C/o some hair thinning. Joints sore/ tailbone sore \"because I lost my cushion.\"  Weight today 193#. Down 20# since last visit. Down 64# since surgery. BMI 27 . Drinking over 64 oz of fluid. Getting in plenty of protein. Drinking 1 protein shakes daily-made with 25 grams of Isopure protein. Also drinking 1-2 cups of Fairlife Milk with 13 grams of protein in each cup. Eating 3-4 times daily, focusing on protein.   No carbonation. No sweets. Tolerating post-op diet. No problems with bowel movements. Nausea/sweating only if eating to quick- typically only occurs at work.  No emesis. No GERD/ Reflux.  Denies CP/SOB. No Dizziness. No abdominal pain. No incisional discomfort. No sx of dehydration. Taking and tolerating all vitamins- CelebrateONE/Calcium 2xd.  Depression/anxiety stable with medication. Continue Vyvanse for ADHD.  Repeat sleep study (-) for CB. Off CPAP.  3 month labs reviewed. Seca scale completed and reviewed.       Physical Activity: strength training with [employer]- dedicated exercise 2 times per week.     Current BMI: Body mass index is 27.52 kg/m².  Current Weight:   Wt Readings from Last 3 Encounters:   01/04/24 87.6 kg (193 lb 3.2 oz)   11/29/23 96.5 kg (212 lb 12.8 oz)   11/15/23 97 kg (213 lb 12.8 oz)     Initial Weight: 258  Pre-op Body Weight:257  EBW: 83  % of EBW lost: 77%      Past Medical History:  Past Medical History:   Diagnosis Date    Anxiety     Arthritis     hips and knees bilat    Depression     Diabetes mellitus (HCC)     GERD (gastroesophageal reflux disease)     Headache     Insulin resistance     CB (obstructive sleep apnea)

## 2024-01-04 NOTE — PATIENT INSTRUCTIONS
Stay well hydrated. Drink a minimum of 64 oz of non-carbonated, non-caffeinated fluids daily.  Nutritional education occurred during visit. Tolerating diet. Continue following with dietitian and follow their recommendations as directed. Continue  60-80  grams of protein each day. Continue to track.   Signs and symptoms reviewed with patient that would be concerning and need her to return to office for re-evaluation. Patient will call if any questions or concerns arrise.  Importance of physical activity discussed with patient.   Continue physical activity and strength training  Continue taking Multivitamin/Calcium   Encouraged to attend support groups  3 month labs reviewed with patient today  6 month labs ordered- to be drawn one week prior to next apt.  SECA scale completed and reviewed with patient today.

## 2024-01-04 NOTE — PATIENT INSTRUCTIONS
1. Continue bariatric vitamins as you are currently taking.  1.  Ok for Bariatric pal Multivitamin with Iron 45 mg one capsule in morning.  2.  Continue one 600 mg calcium tablet with dinner for absorption and separate from Multivitamin.   2. Goal continues to be 60-80 grams protein per day.  Focus on choosing protein foods first at meals to remain full and maintain muscle mass while you continue to lose from body fat stores.  3. Regular physical activity is important if desire to continue weight loss efforts. Recommend regular cardiac activity and strength training 2-3 days per week.  4.  Water goal is 64 oz per day and make sure no liquids 30 minutes before meals and no liquids 30 minutes after meals  5.  Take 20-30 minutes to eat meals and chew all foods well for best tolerance  get lab work done at least 5 -7 days  before next office visit.

## 2024-01-23 ENCOUNTER — OFFICE VISIT (OUTPATIENT)
Age: 24
End: 2024-01-23
Payer: COMMERCIAL

## 2024-01-23 VITALS
WEIGHT: 192.6 LBS | HEIGHT: 70 IN | HEART RATE: 82 BPM | DIASTOLIC BLOOD PRESSURE: 72 MMHG | BODY MASS INDEX: 27.57 KG/M2 | SYSTOLIC BLOOD PRESSURE: 124 MMHG

## 2024-01-23 DIAGNOSIS — E88.810 METABOLIC SYNDROME: ICD-10-CM

## 2024-01-23 DIAGNOSIS — E28.2 POLYCYSTIC OVARY SYNDROME: Primary | ICD-10-CM

## 2024-01-23 PROCEDURE — 99214 OFFICE O/P EST MOD 30 MIN: CPT | Performed by: INTERNAL MEDICINE

## 2024-01-23 NOTE — PROGRESS NOTES
Diagnosis Date    Anxiety     Arthritis     hips and knees bilat    Depression     Diabetes mellitus (HCC)     GERD (gastroesophageal reflux disease)     Headache     Insulin resistance     CB (obstructive sleep apnea)     improved since weight loss surgery 2023    Polycystic ovarian disease     PONV (postoperative nausea and vomiting)     Urinary incontinence       Past Surgical History:   Procedure Laterality Date    ADENOIDECTOMY      CHOLECYSTECTOMY      DILATION AND CURETTAGE OF UTERUS      OTHER SURGICAL HISTORY      lymph node/fatty deposit removal     STANLEY-EN-Y GASTRIC BYPASS N/A 10/2/2023    ROBOTIC STANLEY-EN-Y GASTRIC BYPASS performed by Aman Ellington MD at [facility] OR    TONSILLECTOMY         Family History   Problem Relation Age of Onset    Diabetes Mother     Heart Disease Mother     Other Mother     High Blood Pressure Mother     Depression Mother     Arthritis Mother     Obesity Mother     Hypertension Mother     Arthritis Father     Hypertension Father     Mental Illness Father     ADHD Sister     Autism Sister     Depression Brother     ADHD Brother     Obesity Brother     Hypertension Brother     Autism Brother     Obesity Maternal Grandfather     Hypertension Maternal Grandfather     Arthritis Maternal Grandfather     Obesity Maternal Grandmother     Hypertension Maternal Grandmother     Arthritis Maternal Grandmother     Other Maternal Grandmother         gallbladder    Arthritis Paternal Grandfather     Obesity Paternal Grandfather     Stroke Paternal Grandfather     Hypertension Paternal Grandfather     Diabetes Paternal Grandfather     Heart Disease Paternal Grandfather     Obesity Paternal Grandmother     Arthritis Paternal Grandmother     Diabetes Paternal Grandmother     Heart Disease Paternal Grandmother     Cancer Paternal Grandmother      Social History     Tobacco Use    Smoking status: Former     Current packs/day: 0.00     Types: Cigarettes     Quit date: 5/1/2017     Years since

## 2024-01-26 DIAGNOSIS — F90.2 ATTENTION DEFICIT HYPERACTIVITY DISORDER (ADHD), COMBINED TYPE: ICD-10-CM

## 2024-01-26 NOTE — TELEPHONE ENCOUNTER
Patient called in requesting a refill of the following medication:    Lisdexamfetamine 50mg  #30 with no ref[facility] to the [facility] [place]. Previous prescription sent on 12/07/23 for #30 with no refills.    Patient stated she has 3 doses remaining.    Last visit 12/07/23  Next visit 02/12/24    Pending your approval.

## 2024-01-30 RX ORDER — LISDEXAMFETAMINE DIMESYLATE CAPSULES 50 MG/1
50 CAPSULE ORAL DAILY
Qty: 30 CAPSULE | Refills: 0 | Status: SHIPPED | OUTPATIENT
Start: 2024-01-30 | End: 2024-02-29

## 2024-02-06 ENCOUNTER — CLINICAL DOCUMENTATION (OUTPATIENT)
Age: 24
End: 2024-02-06

## 2024-02-06 ENCOUNTER — TELEPHONE (OUTPATIENT)
Age: 24
End: 2024-02-06

## 2024-02-06 DIAGNOSIS — E28.2 POLYCYSTIC OVARY SYNDROME: Primary | ICD-10-CM

## 2024-02-06 NOTE — PROGRESS NOTES
The reported TSH is normal.  However if she wishes, she can get comprehensive thyroid panel that has been ordered then I will be in better position to opine.

## 2024-02-06 NOTE — TELEPHONE ENCOUNTER
Pt reports running lab on herself and reports fasting TSH was 3.6 on 2/6/24. Pt reports feeling fatigue, hair loss, inability to sleep, cold tendency, and dry skin. Labs were not ordered by a provider, pt reports completing these labs on herself at her job. Please advise.

## 2024-02-23 ENCOUNTER — OFFICE VISIT (OUTPATIENT)
Dept: PSYCHOLOGY | Age: 24
End: 2024-02-23
Payer: COMMERCIAL

## 2024-02-23 DIAGNOSIS — F84.0 AUTISM: ICD-10-CM

## 2024-02-23 DIAGNOSIS — F90.2 ATTENTION DEFICIT HYPERACTIVITY DISORDER (ADHD), COMBINED TYPE: Primary | ICD-10-CM

## 2024-02-23 PROCEDURE — 90837 PSYTX W PT 60 MINUTES: CPT | Performed by: PSYCHOLOGIST
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
## 2024-03-08 ENCOUNTER — OFFICE VISIT (OUTPATIENT)
Dept: PSYCHOLOGY | Age: 24
End: 2024-03-08
Payer: COMMERCIAL

## 2024-03-08 DIAGNOSIS — F41.9 ANXIETY DISORDER, UNSPECIFIED TYPE: ICD-10-CM

## 2024-03-08 DIAGNOSIS — F84.0 AUTISM: ICD-10-CM

## 2024-03-08 DIAGNOSIS — F90.2 ATTENTION DEFICIT HYPERACTIVITY DISORDER (ADHD), COMBINED TYPE: Primary | ICD-10-CM

## 2024-03-08 PROCEDURE — 90837 PSYTX W PT 60 MINUTES: CPT | Performed by: PSYCHOLOGIST

## 2024-03-08 NOTE — PROGRESS NOTES
Behavioral Health Consultation  Bang Badillo, PhD  Psychologist  3/8/2024       Time spent with Patient:  60 minutes  This is patient's fifth  [facility] appointment.    Reason for Consult:  depression, anxiety, stress, and  ADHD  Referring Provider: No referring provider defined for this encounter.    Pt provided informed consent for the behavioral health program. Discussed with patient model of service to include the limits of confidentiality (i.e. abuse reporting, suicide intervention, etc.) and short-term intervention focused approach.  Pt indicated understanding.  Feedback given to PCP.    Description:    MSE:    Appearance    cooperative  Appetite abnormal:  having stomach issues-possible Ulcer  Sleep disturbance Yes  Loss of pleasure No  Speech    normal rate, normal volume, and well articulated  Mood    Anxious  Guilty  Depressed  Low self-esteem  Affect    depressed affect  Thought Content    helplessness  Insight    Fair  Judgment    Intact  Suicide Assessment    no suicidal ideation  Homicidal Assessment Intent No  Cutting No  Enuresis No  Learning Disorder ADHD and Autism      History:    Medications:   Current Outpatient Medications   Medication Sig Dispense Refill    lisdexamfetamine (VYVANSE) 50 MG capsule Take 1 capsule by mouth daily for 30 days. Max Daily Amount: 50 mg 30 capsule 0    calcium carbonate 600 MG TABS tablet Take 1 tablet by mouth daily      traZODone (DESYREL) 50 MG tablet Take 0.5-1 tablets by mouth nightly 30 tablet 1    Multiple Vitamin (MVI, CELEBRATE, CHEWABLE TABLET)       acetaminophen (TYLENOL) 325 MG tablet       ELURYNG 0.12-0.015 MG/24HR vaginal ring INSERT ONE RING VAGINALLY AND LEAVE IN PLACE FOR 3 CONSECUTIVE WEEKS, THEN REMOVE FOR 1 WEEK. INSERT NEW RING 7 DAYS AFTER THE LAST WAS REMOVED       No current facility-administered medications for this visit.       Social History:   Social History     Socioeconomic History    Marital status:      Spouse name: Not on file

## 2024-03-11 ENCOUNTER — TELEPHONE (OUTPATIENT)
Dept: BARIATRICS/WEIGHT MGMT | Age: 24
End: 2024-03-11

## 2024-03-11 DIAGNOSIS — K21.9 GASTROESOPHAGEAL REFLUX DISEASE, UNSPECIFIED WHETHER ESOPHAGITIS PRESENT: Primary | ICD-10-CM

## 2024-03-11 RX ORDER — OMEPRAZOLE 40 MG/1
40 CAPSULE, DELAYED RELEASE ORAL 2 TIMES DAILY
Qty: 60 CAPSULE | Refills: 2 | Status: SHIPPED | OUTPATIENT
Start: 2024-03-11

## 2024-03-11 RX ORDER — SUCRALFATE 1 G/1
1 TABLET ORAL 3 TIMES DAILY
Qty: 120 TABLET | Refills: 0 | Status: SHIPPED | OUTPATIENT
Start: 2024-03-11

## 2024-03-11 NOTE — TELEPHONE ENCOUNTER
Spoke to Marybeth.  Called due to epigastric pain and flu like sx after eating. No abdominal pain. Denies heartburn or regurgitation. Gallbladder previously removed. Admits to significant stress in life. Has started Omeprazole 20mg 2 tablets BID for the last 3 days while seems to be helping some.  No issues with liquids. Has been eating 1 meal daily which she reports is 1/2 to 1 cup of food. Admits to eating quickly when at work.   Has been vomiting 2-3 times per week over the last 2 weeks.     Will also discuss portions/food choices with dietitian today.  Will need to increase frequency of eating and decrease portions.        Will rx Omeprazole 40mg BID and Carafate 3xd before meals.  If no better in the next week, patient will call and will place GI referral.

## 2024-03-12 LAB
ALBUMIN SERPL-MCNC: 4 G/DL (ref 3.5–5)
ALBUMIN/GLOBULIN RATIO: 1.3 (ref 1.2–1.5)
ALK PHOSPHATASE: 72 U/L (ref 38–126)
ALT SERPL-CCNC: 33 U/L (ref 7–35)
AST SERPL-CCNC: 63 U/L (ref 10–42)
BASOPHILS # BLD: 1.1 % (ref 0–3)
BILIRUB SERPL-MCNC: 0.7 MG/DL (ref 0.3–1.2)
BUN BLDV-MCNC: 12 MG/DL (ref 7–22)
CALCIUM SERPL-MCNC: 9.5 MG/DL (ref 8.4–10.2)
CHLORIDE BLD-SCNC: 104 MEQ/L (ref 98–107)
CO2: 24 MEQ/L (ref 22–31)
CREAT SERPL-MCNC: 1 MG/DL (ref 0.4–1.1)
EOSINOPHIL # BLD: 4.3 % (ref 0–4)
FERRITIN: 102 NG/ML (ref 11–307)
FOLATE: >23.9 NG/ML (ref 3.7–24.8)
GFR SERPL CREATININE-BSD FRML MDRD: >=60 ML/MIN/{1.73_M2}
GLOBULIN: 3 G/DL (ref 2.9–3.3)
GLUCOSE: 92 MG/DL (ref 70–126)
HBA1C MFR BLD: 4.28 % (ref 4–6)
HCT VFR BLD CALC: 36.5 % (ref 37–47)
HEMOGLOBIN: 12.8 G/DL (ref 12–16)
IRON % SATURATION: 22 % (ref 15–50)
IRON: 95 UG/DL (ref 50–170)
LYMPHOCYTES # BLD: 41.1 % (ref 17.6–49.6)
MCH RBC QN AUTO: 29.8 PG (ref 28–32)
MCHC RBC AUTO-ENTMCNC: 35.1 G/DL (ref 33–37)
MCV RBC AUTO: 85 FL (ref 81–99)
MONOCYTES # BLD: 5.2 % (ref 4.1–12.4)
PARATHYROID HORMONE INTACT: 25 PG/ML (ref 10–84)
PDW BLD-RTO: 12.5 % (ref 11.5–14.5)
PLATELET # BLD: 272 THOU/CUMM (ref 130–400)
POTASSIUM SERPL-SCNC: 3.4 MEQ/L (ref 3.5–5.1)
PREALBUMIN: 26.1 MG/DL (ref 20–40)
RBC: 4.29 MIL/CUMM (ref 4.2–5.4)
SCAN OF BLOOD SMEAR: NO
SEG NEUTROPHILS: 48.3 % (ref 39.4–72.5)
SODIUM BLD-SCNC: 137 MEQ/L (ref 136–145)
TOTAL IRON BINDING CAPACITY: 424 UG/DL (ref 250–450)
TOTAL PROTEIN: 7 G/DL (ref 6.4–8.3)
TSH REFLEX FT4: 2.2 UIU/ML (ref 0.49–4.67)
VITAMIN B-12: 330 PG/ML (ref 180–914)
VITAMIN D 25-HYDROXY: 30 (ref 30–100)
WBC: 6.1 THOU/CUMM (ref 4.8–10.8)

## 2024-03-14 ENCOUNTER — TELEPHONE (OUTPATIENT)
Dept: BARIATRICS/WEIGHT MGMT | Age: 24
End: 2024-03-14

## 2024-03-14 LAB
COPPER: 191 UG/DL (ref 80–155)
INSULIN, RANDOM OR FASTING: 8 UIU/ML (ref 3–25)
Lab: <0.02 MG/L (ref 0–0.1)
SELENIUM: 115.6 UG/L (ref 23–190)
VITAMIN A LEVEL: 0.81 MG/L (ref 0.3–1.2)
VITAMIN A, INTERP: NORMAL
ZINC: 71.2 UG/DL (ref 60–120)

## 2024-03-14 NOTE — TELEPHONE ENCOUNTER
Called patient for recent lab work of elevated copper.  Pt states has not had chance yet to  additional medications ordered earlier this week by PA for pt calling in with c/o of ongoing n/v.   Pt reported taking Bariatric Pal one a day 45 mg capsule daily but stated \"hasn't been taking it since symptoms started because throw up\".  Later stated does take the MVI but will throw up.  Took it this am without emesis but yesterday emesis.  Suggested pt move the MVI to later with evening meal and pt hesitant to do this as states will just forget to take it.   Drinking water, denies electrolyte or energy drinks.   Pt advised to bring all vitamins into office visit next week. Will discuss plan with PA and recommendations.

## 2024-03-15 LAB
TESTOSTERONE FREE: 0.7 PG/ML (ref 0.8–7.4)
TESTOSTERONE, FREE, LC/MS/MS: 26 NG/DL (ref 9–55)

## 2024-03-17 LAB — VITAMIN B1 WHOLE BLOOD: 93 NMOL/L (ref 70–180)

## 2024-03-21 ENCOUNTER — OFFICE VISIT (OUTPATIENT)
Dept: BARIATRICS/WEIGHT MGMT | Age: 24
End: 2024-03-21
Payer: COMMERCIAL

## 2024-03-21 VITALS
DIASTOLIC BLOOD PRESSURE: 60 MMHG | TEMPERATURE: 98.2 F | HEART RATE: 60 BPM | SYSTOLIC BLOOD PRESSURE: 100 MMHG | HEIGHT: 70 IN | WEIGHT: 174 LBS | BODY MASS INDEX: 24.91 KG/M2

## 2024-03-21 DIAGNOSIS — Z98.84 HISTORY OF ROUX-EN-Y GASTRIC BYPASS: Primary | ICD-10-CM

## 2024-03-21 DIAGNOSIS — F90.9 ATTENTION DEFICIT HYPERACTIVITY DISORDER (ADHD), UNSPECIFIED ADHD TYPE: ICD-10-CM

## 2024-03-21 DIAGNOSIS — K91.2 POSTSURGICAL MALABSORPTION: ICD-10-CM

## 2024-03-21 DIAGNOSIS — K21.9 GASTROESOPHAGEAL REFLUX DISEASE WITHOUT ESOPHAGITIS: ICD-10-CM

## 2024-03-21 DIAGNOSIS — F41.1 GENERALIZED ANXIETY DISORDER: ICD-10-CM

## 2024-03-21 DIAGNOSIS — F32.A DEPRESSION, UNSPECIFIED DEPRESSION TYPE: ICD-10-CM

## 2024-03-21 DIAGNOSIS — Z13.21 SCREENING FOR MALNUTRITION: ICD-10-CM

## 2024-03-21 PROBLEM — E11.9 TYPE 2 DIABETES MELLITUS WITHOUT COMPLICATION (HCC): Status: RESOLVED | Noted: 2023-10-02 | Resolved: 2024-03-21

## 2024-03-21 PROBLEM — E66.01 MORBID OBESITY (HCC): Status: RESOLVED | Noted: 2023-10-02 | Resolved: 2024-03-21

## 2024-03-21 PROCEDURE — 1036F TOBACCO NON-USER: CPT | Performed by: PHYSICIAN ASSISTANT

## 2024-03-21 PROCEDURE — 99214 OFFICE O/P EST MOD 30 MIN: CPT | Performed by: PHYSICIAN ASSISTANT

## 2024-03-21 PROCEDURE — G8420 CALC BMI NORM PARAMETERS: HCPCS | Performed by: PHYSICIAN ASSISTANT

## 2024-03-21 PROCEDURE — G8482 FLU IMMUNIZE ORDER/ADMIN: HCPCS | Performed by: PHYSICIAN ASSISTANT

## 2024-03-21 PROCEDURE — G8427 DOCREV CUR MEDS BY ELIG CLIN: HCPCS | Performed by: PHYSICIAN ASSISTANT

## 2024-03-21 NOTE — PROGRESS NOTES
traZODone (DESYREL) 50 MG tablet Take 0.5-1 tablets by mouth nightly 30 tablet 1    Multiple Vitamin (MVI, CELEBRATE, CHEWABLE TABLET)       acetaminophen (TYLENOL) 325 MG tablet       ELURYNG 0.12-0.015 MG/24HR vaginal ring INSERT ONE RING VAGINALLY AND LEAVE IN PLACE FOR 3 CONSECUTIVE WEEKS, THEN REMOVE FOR 1 WEEK. INSERT NEW RING 7 DAYS AFTER THE LAST WAS REMOVED      lisdexamfetamine (VYVANSE) 50 MG capsule Take 1 capsule by mouth daily for 30 days. Max Daily Amount: 50 mg 30 capsule 0     No current facility-administered medications for this visit.       Allergies:   Allergies   Allergen Reactions    Adhesive Tape Rash    Metformin Diarrhea and Nausea And Vomiting       Subjective:    Review of Systems:  Constitutional: Denies any fever, chills, fatigue.  Wound: Denies any rash, skin color changes or wound problems.  Resp: Denies any cough, shortness of breath.  CV: Denies any chest pain, orthopnea or syncope.   MS: (+)myalgias/ arthralgias- knees  GI: Denies any nausea, vomiting, diarrhea, constipation, melena, hematochezia. No incisional discomfort. (+) reflux  : Denies any hematuria, hesitancy or dysuria.   NEURO: Denies seizures, headache.      Objective:    /60 (Site: Right Upper Arm, Position: Sitting, Cuff Size: Large Adult)   Pulse 60   Temp 98.2 °F (36.8 °C) (Oral)   Ht 1.784 m (5' 10.25\")   Wt 78.9 kg (174 lb)   BMI 24.79 kg/m²     Physical Examination:   Constitutional: Alert and oriented to person, place and time. Well-developed, well- nourished.  Head: Normocephalic and atraumatic  Neck: Supple.  Eyes: EOMI b/l. Conjunctivae normal.  No scleral icterus.  Respiratory: Effort normal. No respiratory distress.  Abd: Benign  Ext:  Movement x 4.  No edema  Skin; Warm and dry, no visible rashes, lesions or ulcers.   Neuro: Cranial Nerves Grossly Intact; nml coordination          Labs:  CBC   Lab Results   Component Value Date/Time    WBC 6.1 03/12/2024 07:19 AM    WBC 5.6 04/19/2022 12:00 AM

## 2024-03-21 NOTE — PATIENT INSTRUCTIONS
Stay well hydrated. Drink a minimum of 64 oz of non-carbonated, non-caffeinated fluids daily.  Nutritional education occurred during visit. Tolerating diet. Continue following with dietitian and follow their recommendations as directed. Continue  60-80 grams of protein each day.    Signs and symptoms reviewed with patient that would be concerning and need her to return to office for re-evaluation. Patient will call if any questions or concerns arrise.  Importance of physical activity discussed with patient.   Increase physical activity as tolerated  Add strength training  Continue taking Bariatric Pal and Calcium   Encouraged to attend support groups  6 month labs reviewed with patient today  Copper 191- will repeat Zinc and Copper in 6 weeks.   SECA scale completed and reviewed with patient today  Measurements completed and reviewed with patient today  Will continue with Carafate/Prilosec BID-if epigastric pain increased/doesn't resolve will send to GI. Patient to call if referral needed.

## 2024-03-26 ENCOUNTER — OFFICE VISIT (OUTPATIENT)
Dept: PSYCHIATRY | Age: 24
End: 2024-03-26
Payer: COMMERCIAL

## 2024-03-26 VITALS
HEIGHT: 70 IN | DIASTOLIC BLOOD PRESSURE: 68 MMHG | SYSTOLIC BLOOD PRESSURE: 116 MMHG | OXYGEN SATURATION: 97 % | HEART RATE: 72 BPM | BODY MASS INDEX: 25.77 KG/M2 | WEIGHT: 180 LBS

## 2024-03-26 DIAGNOSIS — F41.9 ANXIETY: ICD-10-CM

## 2024-03-26 DIAGNOSIS — F33.1 MODERATE EPISODE OF RECURRENT MAJOR DEPRESSIVE DISORDER (HCC): Primary | ICD-10-CM

## 2024-03-26 DIAGNOSIS — F90.2 ATTENTION DEFICIT HYPERACTIVITY DISORDER (ADHD), COMBINED TYPE: ICD-10-CM

## 2024-03-26 PROCEDURE — G8482 FLU IMMUNIZE ORDER/ADMIN: HCPCS | Performed by: NURSE PRACTITIONER

## 2024-03-26 PROCEDURE — 99214 OFFICE O/P EST MOD 30 MIN: CPT | Performed by: NURSE PRACTITIONER

## 2024-03-26 PROCEDURE — G8427 DOCREV CUR MEDS BY ELIG CLIN: HCPCS | Performed by: NURSE PRACTITIONER

## 2024-03-26 PROCEDURE — 1036F TOBACCO NON-USER: CPT | Performed by: NURSE PRACTITIONER

## 2024-03-26 PROCEDURE — G8417 CALC BMI ABV UP PARAM F/U: HCPCS | Performed by: NURSE PRACTITIONER

## 2024-03-26 PROCEDURE — 90833 PSYTX W PT W E/M 30 MIN: CPT | Performed by: NURSE PRACTITIONER

## 2024-03-26 RX ORDER — FLUOXETINE HYDROCHLORIDE 20 MG/1
20 CAPSULE ORAL DAILY
Qty: 30 CAPSULE | Refills: 1 | Status: SHIPPED | OUTPATIENT
Start: 2024-03-26

## 2024-03-26 RX ORDER — LISDEXAMFETAMINE DIMESYLATE CAPSULES 50 MG/1
50 CAPSULE ORAL DAILY
Qty: 30 CAPSULE | Refills: 0 | Status: SHIPPED | OUTPATIENT
Start: 2024-04-25 | End: 2024-05-25

## 2024-03-26 RX ORDER — LISDEXAMFETAMINE DIMESYLATE CAPSULES 50 MG/1
50 CAPSULE ORAL DAILY
Qty: 30 CAPSULE | Refills: 0 | Status: SHIPPED | OUTPATIENT
Start: 2024-05-25 | End: 2024-06-24

## 2024-03-26 RX ORDER — BUSPIRONE HYDROCHLORIDE 15 MG/1
15 TABLET ORAL 2 TIMES DAILY
Qty: 60 TABLET | Refills: 1 | Status: SHIPPED | OUTPATIENT
Start: 2024-03-26

## 2024-03-26 RX ORDER — TRAZODONE HYDROCHLORIDE 50 MG/1
25-50 TABLET ORAL NIGHTLY
Qty: 30 TABLET | Refills: 2 | Status: CANCELLED | OUTPATIENT
Start: 2024-03-26

## 2024-03-26 RX ORDER — LISDEXAMFETAMINE DIMESYLATE CAPSULES 50 MG/1
50 CAPSULE ORAL DAILY
Qty: 30 CAPSULE | Refills: 0 | Status: SHIPPED | OUTPATIENT
Start: 2024-03-26 | End: 2024-04-25

## 2024-03-26 ASSESSMENT — PATIENT HEALTH QUESTIONNAIRE - PHQ9
1. LITTLE INTEREST OR PLEASURE IN DOING THINGS: SEVERAL DAYS
2. FEELING DOWN, DEPRESSED OR HOPELESS: NEARLY EVERY DAY
6. FEELING BAD ABOUT YOURSELF - OR THAT YOU ARE A FAILURE OR HAVE LET YOURSELF OR YOUR FAMILY DOWN: NEARLY EVERY DAY
SUM OF ALL RESPONSES TO PHQ QUESTIONS 1-9: 19
3. TROUBLE FALLING OR STAYING ASLEEP: NEARLY EVERY DAY
4. FEELING TIRED OR HAVING LITTLE ENERGY: NEARLY EVERY DAY
5. POOR APPETITE OR OVEREATING: SEVERAL DAYS
SUM OF ALL RESPONSES TO PHQ QUESTIONS 1-9: 19
7. TROUBLE CONCENTRATING ON THINGS, SUCH AS READING THE NEWSPAPER OR WATCHING TELEVISION: NEARLY EVERY DAY
SUM OF ALL RESPONSES TO PHQ QUESTIONS 1-9: 19
9. THOUGHTS THAT YOU WOULD BE BETTER OFF DEAD, OR OF HURTING YOURSELF: NOT AT ALL
SUM OF ALL RESPONSES TO PHQ QUESTIONS 1-9: 19
10. IF YOU CHECKED OFF ANY PROBLEMS, HOW DIFFICULT HAVE THESE PROBLEMS MADE IT FOR YOU TO DO YOUR WORK, TAKE CARE OF THINGS AT HOME, OR GET ALONG WITH OTHER PEOPLE: VERY DIFFICULT
8. MOVING OR SPEAKING SO SLOWLY THAT OTHER PEOPLE COULD HAVE NOTICED. OR THE OPPOSITE, BEING SO FIGETY OR RESTLESS THAT YOU HAVE BEEN MOVING AROUND A LOT MORE THAN USUAL: MORE THAN HALF THE DAYS
SUM OF ALL RESPONSES TO PHQ9 QUESTIONS 1 & 2: 4

## 2024-03-26 ASSESSMENT — ANXIETY QUESTIONNAIRES
6. BECOMING EASILY ANNOYED OR IRRITABLE: 3-NEARLY EVERY DAY
4. TROUBLE RELAXING: 3-NEARLY EVERY DAY
2. NOT BEING ABLE TO STOP OR CONTROL WORRYING: 3-NEARLY EVERY DAY
1. FEELING NERVOUS, ANXIOUS, OR ON EDGE: NEARLY EVERY DAY
5. BEING SO RESTLESS THAT IT IS HARD TO SIT STILL: 3-NEARLY EVERY DAY
3. WORRYING TOO MUCH ABOUT DIFFERENT THINGS: 3-NEARLY EVERY DAY
GAD7 TOTAL SCORE: 19
7. FEELING AFRAID AS IF SOMETHING AWFUL MIGHT HAPPEN: 1-SEVERAL DAYS
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
## 2024-04-09 ENCOUNTER — OFFICE VISIT (OUTPATIENT)
Dept: PSYCHOLOGY | Age: 24
End: 2024-04-09
Payer: COMMERCIAL

## 2024-04-09 DIAGNOSIS — F90.2 ATTENTION DEFICIT HYPERACTIVITY DISORDER (ADHD), COMBINED TYPE: Primary | ICD-10-CM

## 2024-04-09 DIAGNOSIS — F41.9 ANXIETY DISORDER, UNSPECIFIED TYPE: ICD-10-CM

## 2024-04-09 DIAGNOSIS — F84.0 AUTISM: ICD-10-CM

## 2024-04-09 PROCEDURE — 1036F TOBACCO NON-USER: CPT | Performed by: PSYCHOLOGIST

## 2024-04-09 PROCEDURE — 90837 PSYTX W PT 60 MINUTES: CPT | Performed by: PSYCHOLOGIST

## 2024-04-09 NOTE — PROGRESS NOTES
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     MARRIAGES: two. First marriage was March 2018 until it ended in divorce in Oct. 2020. Second marriage was June 2022 until they  9 days later due to her spouse having an affair. They are in divorce proceedings currently; final court hearing was May 31, 2023 and her spouse did not attend so paperwork is still pending.     CHILDREN: two (oldest is from her first marriage, second child is from a relationship between the first and second marriage)    SUBSTANCE USE:    1. Marijuana: tried it in high school but did not like how it made her feel.      Social Determinants of Health     Financial Resource Strain: Low Risk  (3/31/2022)    Overall Financial Resource Strain (CARDIA)     Difficulty of Paying Living Expenses: Not hard at all   Food Insecurity: No Food Insecurity (3/31/2022)    Hunger Vital Sign     Worried About Running Out of Food in the Last Year: Never true     Ran Out of Food in the Last Year: Never true   Transportation Needs: Not on file   Physical Activity: Not on file   Stress: Not on file   Social Connections: Not on file   Intimate Partner Violence: Not on file   Housing Stability: Not on file       TOBACCO:   reports that she quit smoking about 6 years ago. Her smoking use included cigarettes. She has never used smokeless tobacco.  ETOH:   reports current alcohol use.    Family History:   Family History   Problem Relation Age of Onset    Diabetes Mother     Heart Disease Mother     Other Mother     High Blood Pressure Mother     Depression Mother     Arthritis Mother     Obesity Mother     Hypertension Mother     Arthritis Father     Hypertension Father     Mental Illness Father     ADHD Sister     Autism Sister     Depression Brother     ADHD Brother     Obesity Brother     Hypertension Brother     Autism Brother     Obesity Maternal Grandfather     Hypertension Maternal

## 2024-04-10 ENCOUNTER — TELEPHONE (OUTPATIENT)
Dept: PSYCHIATRY | Age: 24
End: 2024-04-10

## 2024-04-10 NOTE — TELEPHONE ENCOUNTER
Marybeth wrote back into the office via [employer]:    I am part time employee working full time hours, yes. However there are also other issues at play. Not sure if it would be easiest to converse over the phone or if she is fine with my zayas messaging.     *She has seen Dr. Badillo a few times but does not return until the 07/2024 time frame due to limited availability.     Please advise.

## 2024-04-10 NOTE — TELEPHONE ENCOUNTER
Chief Complaint   Patient presents with   • T-5000     hand injury     Patients right hand was smashed in automatic door, hematoma to top of hand, CMS intact. VSS. Patient given ice, ERP to eval.     Clarifying: when we met last she mentioned she was employed in a part-time position but her employer was requiring her to work full-time hours. This was causing her added stress and challenges. Is this still occurring? Would she prefer to work only part-time hours? If so, how many hours per week is considered part-time.     Also, what is her counseling schedule with Dr. Badillo?

## 2024-04-10 NOTE — TELEPHONE ENCOUNTER
Along with the following:    To further elaborate though, when I became part time, I signed a paper stating that I agree to drop from 72 hours per pay period (full time) to 48 hours per pay period (part time). Pay period is 2 weeks.  Additionally, we are supposed to be on a weekend rotation of every 3rd weekend for third shift weekends. So I'm working 12 hour day shifts during the week, attending classes in [place], [place] the other 3 days days, and working almost every other Saturday night instead of every third. When I confronted my boss about this, I was told to essentially suck it up and if I didn't want to work one of my scheduled weekends, I could trade. But that requires someone else to agree to trade me which isn't happening. HR is completely hands off when it comes to our schedule, so I'm not sure if they can do anything or not (or if they would if they could). I'd honestly like to quit, but cannot afford to do so and I am not hireable elsewhere with my limited availability with school. I'm honestly stressed out and having increased difficulty coping with everything that has been going on the last 6-8 months. I feel like I'm at the end of my rope.     Sorry for the long message, but I tried to explain the best that I could.     Please advise.

## 2024-04-10 NOTE — TELEPHONE ENCOUNTER
Marybeth wrote into the office via Xanitos:    Hello     During our last visit, we discussed potentially taking medical leave from work to work on my mental health and emotions. At that time, I was on the fence about doing it. However, after my appointment with Dr. Badillo today and recent events with work, I may be reconsidering it. What would be your recommendations regarding the leave (duration, limitations, etc) so I can better determine if that would be the correct course of action.        Thank you.     Please advise; she is scheduled to return on 04/30/24 and is following with Dr. Badillo as well.

## 2024-04-11 NOTE — TELEPHONE ENCOUNTER
Would be amendable to writing patient FMLA to limit hours to only those required to fulfill her part-time employment status. Is this something she would like to proceed?

## 2024-04-11 NOTE — TELEPHONE ENCOUNTER
Marybeth wrote back into the office via Jackrabbit:    Yes, that would be acceptable. * I will provide our fax number to have this information sent and the process. Please advise anything further?

## 2024-04-30 ENCOUNTER — OFFICE VISIT (OUTPATIENT)
Dept: PSYCHIATRY | Age: 24
End: 2024-04-30
Payer: COMMERCIAL

## 2024-04-30 VITALS
OXYGEN SATURATION: 99 % | SYSTOLIC BLOOD PRESSURE: 113 MMHG | DIASTOLIC BLOOD PRESSURE: 72 MMHG | HEART RATE: 89 BPM | HEIGHT: 70 IN | BODY MASS INDEX: 24.26 KG/M2 | WEIGHT: 169.5 LBS

## 2024-04-30 DIAGNOSIS — F43.0 ACUTE STRESS REACTION: ICD-10-CM

## 2024-04-30 DIAGNOSIS — F33.1 MODERATE EPISODE OF RECURRENT MAJOR DEPRESSIVE DISORDER (HCC): Primary | ICD-10-CM

## 2024-04-30 DIAGNOSIS — F90.2 ATTENTION DEFICIT HYPERACTIVITY DISORDER (ADHD), COMBINED TYPE: ICD-10-CM

## 2024-04-30 DIAGNOSIS — F41.9 ANXIETY: ICD-10-CM

## 2024-04-30 PROCEDURE — G8420 CALC BMI NORM PARAMETERS: HCPCS | Performed by: NURSE PRACTITIONER

## 2024-04-30 PROCEDURE — 1036F TOBACCO NON-USER: CPT | Performed by: NURSE PRACTITIONER

## 2024-04-30 PROCEDURE — G8427 DOCREV CUR MEDS BY ELIG CLIN: HCPCS | Performed by: NURSE PRACTITIONER

## 2024-04-30 PROCEDURE — 99214 OFFICE O/P EST MOD 30 MIN: CPT | Performed by: NURSE PRACTITIONER

## 2024-04-30 PROCEDURE — 90833 PSYTX W PT W E/M 30 MIN: CPT | Performed by: NURSE PRACTITIONER

## 2024-04-30 RX ORDER — FLUOXETINE HYDROCHLORIDE 40 MG/1
40 CAPSULE ORAL DAILY
Qty: 30 CAPSULE | Refills: 0
Start: 2024-04-30

## 2024-04-30 ASSESSMENT — PATIENT HEALTH QUESTIONNAIRE - PHQ9
5. POOR APPETITE OR OVEREATING: SEVERAL DAYS
9. THOUGHTS THAT YOU WOULD BE BETTER OFF DEAD, OR OF HURTING YOURSELF: NOT AT ALL
7. TROUBLE CONCENTRATING ON THINGS, SUCH AS READING THE NEWSPAPER OR WATCHING TELEVISION: NEARLY EVERY DAY
1. LITTLE INTEREST OR PLEASURE IN DOING THINGS: SEVERAL DAYS
8. MOVING OR SPEAKING SO SLOWLY THAT OTHER PEOPLE COULD HAVE NOTICED. OR THE OPPOSITE, BEING SO FIGETY OR RESTLESS THAT YOU HAVE BEEN MOVING AROUND A LOT MORE THAN USUAL: NOT AT ALL
SUM OF ALL RESPONSES TO PHQ9 QUESTIONS 1 & 2: 2
10. IF YOU CHECKED OFF ANY PROBLEMS, HOW DIFFICULT HAVE THESE PROBLEMS MADE IT FOR YOU TO DO YOUR WORK, TAKE CARE OF THINGS AT HOME, OR GET ALONG WITH OTHER PEOPLE: VERY DIFFICULT
SUM OF ALL RESPONSES TO PHQ QUESTIONS 1-9: 12
3. TROUBLE FALLING OR STAYING ASLEEP: NEARLY EVERY DAY
2. FEELING DOWN, DEPRESSED OR HOPELESS: SEVERAL DAYS
4. FEELING TIRED OR HAVING LITTLE ENERGY: MORE THAN HALF THE DAYS
SUM OF ALL RESPONSES TO PHQ QUESTIONS 1-9: 12
SUM OF ALL RESPONSES TO PHQ QUESTIONS 1-9: 12
6. FEELING BAD ABOUT YOURSELF - OR THAT YOU ARE A FAILURE OR HAVE LET YOURSELF OR YOUR FAMILY DOWN: SEVERAL DAYS
SUM OF ALL RESPONSES TO PHQ QUESTIONS 1-9: 12

## 2024-04-30 ASSESSMENT — ANXIETY QUESTIONNAIRES
7. FEELING AFRAID AS IF SOMETHING AWFUL MIGHT HAPPEN: NOT AT ALL
GAD7 TOTAL SCORE: 12
1. FEELING NERVOUS, ANXIOUS, OR ON EDGE: MORE THAN HALF THE DAYS
2. NOT BEING ABLE TO STOP OR CONTROL WORRYING: MORE THAN HALF THE DAYS
4. TROUBLE RELAXING: MORE THAN HALF THE DAYS
6. BECOMING EASILY ANNOYED OR IRRITABLE: NEARLY EVERY DAY
IF YOU CHECKED OFF ANY PROBLEMS ON THIS QUESTIONNAIRE, HOW DIFFICULT HAVE THESE PROBLEMS MADE IT FOR YOU TO DO YOUR WORK, TAKE CARE OF THINGS AT HOME, OR GET ALONG WITH OTHER PEOPLE: SOMEWHAT DIFFICULT
3. WORRYING TOO MUCH ABOUT DIFFERENT THINGS: MORE THAN HALF THE DAYS
5. BEING SO RESTLESS THAT IT IS HARD TO SIT STILL: SEVERAL DAYS

## 2024-04-30 NOTE — PROGRESS NOTES
[facility]  [facility]  [address]  [facility] [address]  Dept: [phone]  Dept Fax: [phone]  Loc: [phone]    Visit Date: 4/30/2024    SUBJECTIVE DATA     CHIEF COMPLAINT:    Chief Complaint   Patient presents with    Depression    ADHD    Follow-up    Medication Check       History obtained from: patient    HISTORY OF PRESENT ILLNESS:    Marybeth Escalante is a 24 y.o. female who presents to the office for follow-up on complaints of ADHD and anxiety. Last appointment was 03/26/2024.    States she is okay but mood is still poor.  -started the Prozac but no change  -she has tried the BuSpar a couple of times and had severe stomach pain - not taking it at this time - unsure if the pain is related to the BuSpar or if due to other underlying GI issues    She will be seeing GI on Monday    She is not sleeping well  -if she takes the Trazodone she gets too sleepy so she cannot take it every night  -cannot turn her mind off at night - doesn't feel worried or anxious    Still some stressors with work  -still with stressors with her boss  -she is required to work part-time at 24 hours per week  -her boss is scheduling her anywhere between 24-36 hours per week    She continues with full-time college courses  -this semester ends this week  -over the summer she has 9 credit hours  -in the fall she will have 12 credit hours with 3 days/weeks in classroom requirement    Mood:  -feeling down, sad, depressed  -reports feeling worthless, hopeless, helpless  -poor motivation  -endorses anhedonia  -fatigue is present  -anxiety is bothersome  -racing thoughts  -worry  -irritability      Denies suicidal ideations, intent, plan. No homicidal ideations, intent, plan. No audiovisual hallucinations.      HPI      PSYCHIATRIC HISTORY:  Patient has had prior care with the following:    [x] Psychiatrist ([place], saw pediatric psychiatrist)    [x] Psychologist

## 2024-05-21 RX ORDER — TRAZODONE HYDROCHLORIDE 50 MG/1
TABLET ORAL
Qty: 30 TABLET | Refills: 0 | Status: SHIPPED | OUTPATIENT
Start: 2024-05-21

## 2024-05-21 NOTE — TELEPHONE ENCOUNTER
Refill request(s) approved and sent to pharmacy for only 30 day supply without additional refills.

## 2024-05-21 NOTE — TELEPHONE ENCOUNTER
Pharmacy is requesting a refill of the following medication:    Trazodone 50mg  #30 with 1 refill to the [facility] in [place]. Previous prescription was sent on 12/07/23 for #30 with 1 refill with a last fill date of 04/17/24.    Called patient several times to see if she needed the refill and patient never responded back.    Last visit 04/30/24  Next visit 05/30/24    Pending your approval

## 2024-05-30 ENCOUNTER — OFFICE VISIT (OUTPATIENT)
Dept: PSYCHIATRY | Age: 24
End: 2024-05-30
Payer: COMMERCIAL

## 2024-05-30 VITALS
WEIGHT: 161 LBS | SYSTOLIC BLOOD PRESSURE: 108 MMHG | HEART RATE: 92 BPM | DIASTOLIC BLOOD PRESSURE: 71 MMHG | BODY MASS INDEX: 23.05 KG/M2 | OXYGEN SATURATION: 98 % | HEIGHT: 70 IN

## 2024-05-30 DIAGNOSIS — F90.2 ATTENTION DEFICIT HYPERACTIVITY DISORDER (ADHD), COMBINED TYPE: ICD-10-CM

## 2024-05-30 DIAGNOSIS — F41.9 ANXIETY: ICD-10-CM

## 2024-05-30 DIAGNOSIS — F33.1 MODERATE EPISODE OF RECURRENT MAJOR DEPRESSIVE DISORDER (HCC): Primary | ICD-10-CM

## 2024-05-30 PROCEDURE — G8420 CALC BMI NORM PARAMETERS: HCPCS | Performed by: NURSE PRACTITIONER

## 2024-05-30 PROCEDURE — 99214 OFFICE O/P EST MOD 30 MIN: CPT | Performed by: NURSE PRACTITIONER

## 2024-05-30 PROCEDURE — 90833 PSYTX W PT W E/M 30 MIN: CPT | Performed by: NURSE PRACTITIONER

## 2024-05-30 PROCEDURE — G8427 DOCREV CUR MEDS BY ELIG CLIN: HCPCS | Performed by: NURSE PRACTITIONER

## 2024-05-30 PROCEDURE — 1036F TOBACCO NON-USER: CPT | Performed by: NURSE PRACTITIONER

## 2024-05-30 RX ORDER — LISDEXAMFETAMINE DIMESYLATE 50 MG/1
50 CAPSULE ORAL DAILY
Qty: 30 CAPSULE | Refills: 0 | Status: SHIPPED | OUTPATIENT
Start: 2024-06-24 | End: 2024-07-24

## 2024-05-30 RX ORDER — FLUOXETINE HYDROCHLORIDE 40 MG/1
40 CAPSULE ORAL DAILY
Qty: 30 CAPSULE | Refills: 2 | Status: SHIPPED | OUTPATIENT
Start: 2024-05-30

## 2024-05-30 RX ORDER — LISDEXAMFETAMINE DIMESYLATE 50 MG/1
50 CAPSULE ORAL DAILY
Qty: 30 CAPSULE | Refills: 0 | Status: SHIPPED | OUTPATIENT
Start: 2024-08-23 | End: 2024-09-22

## 2024-05-30 RX ORDER — LISDEXAMFETAMINE DIMESYLATE 50 MG/1
50 CAPSULE ORAL DAILY
Qty: 30 CAPSULE | Refills: 0 | Status: SHIPPED | OUTPATIENT
Start: 2024-07-24 | End: 2024-08-23

## 2024-05-30 ASSESSMENT — PATIENT HEALTH QUESTIONNAIRE - PHQ9
4. FEELING TIRED OR HAVING LITTLE ENERGY: SEVERAL DAYS
8. MOVING OR SPEAKING SO SLOWLY THAT OTHER PEOPLE COULD HAVE NOTICED. OR THE OPPOSITE, BEING SO FIGETY OR RESTLESS THAT YOU HAVE BEEN MOVING AROUND A LOT MORE THAN USUAL: NOT AT ALL
9. THOUGHTS THAT YOU WOULD BE BETTER OFF DEAD, OR OF HURTING YOURSELF: NOT AT ALL
5. POOR APPETITE OR OVEREATING: SEVERAL DAYS
SUM OF ALL RESPONSES TO PHQ QUESTIONS 1-9: 3
2. FEELING DOWN, DEPRESSED OR HOPELESS: NOT AT ALL
SUM OF ALL RESPONSES TO PHQ9 QUESTIONS 1 & 2: 0
SUM OF ALL RESPONSES TO PHQ QUESTIONS 1-9: 3
SUM OF ALL RESPONSES TO PHQ QUESTIONS 1-9: 3
1. LITTLE INTEREST OR PLEASURE IN DOING THINGS: NOT AT ALL
6. FEELING BAD ABOUT YOURSELF - OR THAT YOU ARE A FAILURE OR HAVE LET YOURSELF OR YOUR FAMILY DOWN: NOT AT ALL
7. TROUBLE CONCENTRATING ON THINGS, SUCH AS READING THE NEWSPAPER OR WATCHING TELEVISION: NOT AT ALL
SUM OF ALL RESPONSES TO PHQ QUESTIONS 1-9: 3
3. TROUBLE FALLING OR STAYING ASLEEP: SEVERAL DAYS
10. IF YOU CHECKED OFF ANY PROBLEMS, HOW DIFFICULT HAVE THESE PROBLEMS MADE IT FOR YOU TO DO YOUR WORK, TAKE CARE OF THINGS AT HOME, OR GET ALONG WITH OTHER PEOPLE: SOMEWHAT DIFFICULT

## 2024-05-30 ASSESSMENT — ANXIETY QUESTIONNAIRES
7. FEELING AFRAID AS IF SOMETHING AWFUL MIGHT HAPPEN: NOT AT ALL
IF YOU CHECKED OFF ANY PROBLEMS ON THIS QUESTIONNAIRE, HOW DIFFICULT HAVE THESE PROBLEMS MADE IT FOR YOU TO DO YOUR WORK, TAKE CARE OF THINGS AT HOME, OR GET ALONG WITH OTHER PEOPLE: NOT DIFFICULT AT ALL
2. NOT BEING ABLE TO STOP OR CONTROL WORRYING: NOT AT ALL
5. BEING SO RESTLESS THAT IT IS HARD TO SIT STILL: NOT AT ALL
1. FEELING NERVOUS, ANXIOUS, OR ON EDGE: NOT AT ALL
3. WORRYING TOO MUCH ABOUT DIFFERENT THINGS: NOT AT ALL
GAD7 TOTAL SCORE: 2
4. TROUBLE RELAXING: SEVERAL DAYS
6. BECOMING EASILY ANNOYED OR IRRITABLE: SEVERAL DAYS

## 2024-05-30 NOTE — PROGRESS NOTES
management.      Provider Signature:  Electronically signed by Roseline Merida, ONESIMO - CNP     **This report has been created using voice recognition software. It may contain minor errors which are inherent in voice recognition technology.**

## 2024-06-24 NOTE — PROGRESS NOTES
[facility]  [address], [address]  [place] [address]  [phone]      Visit Date:  6/25/2024  Weight Management Postop Follow-up    HPI:      Marybeth Escalante is a 24 y.o. female who is here today for 9 month follow up since RYGB performed by Dr. Ellington  on 10/2/23.   Weight today 160#. Down 14# since last visit. Down 97# since surgery. BMI 22 . Drinking 4-5 bottles of water daily. Drinking sugar free Gatorade once daily. Getting in plenty of protein. Drinking protein shakes 3-4 times per week. Eating 3 meals daily, each with 20-25 grams of protein. Using protein powder in food. No pop/ carbonation. No sweets. Tolerating post-op diet. Bowels regular with colace once daily.  No nausea. No emesis. Heartburn well controlled with Omeprazole BID - taking Carafate prn only. Epigastic/stomach pain much better.  Denies CP/SOB. No abdominal pain.  Taking and tolerating all vitamins- Bariatric Pal/Calcium. Labs reviewed. Copper and Zinc  now wnl. Continues to follow depression/anxiety/ADHD with Roseline Merida CNP.  Currently holding Buspar as anxiety improved.  Continues Prozac and Vyvanse. Trazadone qhs prn.    Reports that she has struggled with hypotension/light headed/tunnel vision for several years- seems to be worse since surgery. No syncope.  Has been trying to transition slowly.  Has recently added more sodium to diet. Staying hydrated.  Has not been monitoring BP. Stable today.  Eating every 2-3 hours. Advised to start monitoring BP and follow up with PCP.       Physical Activity: Walking/swimming daily/ free weights 3 times per week.    Current BMI: Body mass index is 22.79 kg/m².  Current Weight:   Wt Readings from Last 3 Encounters:   06/25/24 72.6 kg (160 lb)   05/30/24 73 kg (161 lb)   04/30/24 76.9 kg (169 lb 8 oz)     Initial Weight: 258  Pre-op Body Weight:257  EBW: 83  % of EBW lost: >100%      Past Medical History:  Past Medical History:   Diagnosis Date

## 2024-06-25 ENCOUNTER — OFFICE VISIT (OUTPATIENT)
Dept: BARIATRICS/WEIGHT MGMT | Age: 24
End: 2024-06-25
Payer: COMMERCIAL

## 2024-06-25 VITALS
DIASTOLIC BLOOD PRESSURE: 74 MMHG | HEIGHT: 70 IN | SYSTOLIC BLOOD PRESSURE: 102 MMHG | HEART RATE: 106 BPM | BODY MASS INDEX: 22.9 KG/M2 | WEIGHT: 160 LBS | TEMPERATURE: 97.9 F

## 2024-06-25 DIAGNOSIS — K91.2 POSTSURGICAL MALABSORPTION: ICD-10-CM

## 2024-06-25 DIAGNOSIS — Z98.84 HISTORY OF ROUX-EN-Y GASTRIC BYPASS: ICD-10-CM

## 2024-06-25 DIAGNOSIS — Z98.84 HISTORY OF ROUX-EN-Y GASTRIC BYPASS: Primary | ICD-10-CM

## 2024-06-25 DIAGNOSIS — F32.A DEPRESSION, UNSPECIFIED DEPRESSION TYPE: ICD-10-CM

## 2024-06-25 DIAGNOSIS — I95.9 HYPOTENSION, UNSPECIFIED HYPOTENSION TYPE: Primary | ICD-10-CM

## 2024-06-25 DIAGNOSIS — F41.1 GENERALIZED ANXIETY DISORDER: ICD-10-CM

## 2024-06-25 DIAGNOSIS — F90.9 ATTENTION DEFICIT HYPERACTIVITY DISORDER (ADHD), UNSPECIFIED ADHD TYPE: ICD-10-CM

## 2024-06-25 DIAGNOSIS — K21.9 GASTROESOPHAGEAL REFLUX DISEASE WITHOUT ESOPHAGITIS: ICD-10-CM

## 2024-06-25 DIAGNOSIS — Z13.21 SCREENING FOR MALNUTRITION: ICD-10-CM

## 2024-06-25 PROCEDURE — 99214 OFFICE O/P EST MOD 30 MIN: CPT | Performed by: PHYSICIAN ASSISTANT

## 2024-06-25 NOTE — PATIENT INSTRUCTIONS
Stay well hydrated. Drink a minimum of 64 oz of non-carbonated, non-caffeinated fluids daily.  Nutritional education occurred during visit. Tolerating diet. Continue following with dietitian and follow their recommendations as directed. Continue  60-80 grams of protein each day.    Signs and symptoms reviewed with patient that would be concerning and need her to return to office for re-evaluation. Patient will call if any questions or concerns arrise.  Continue dedicated activity and strength training   Continue taking Multivitamin and Calcium, as directed  Encouraged to attend support groups  1 year labs ordered- to be drawn prior to next apt  SECA scale next visit  Measurements next visit  Dietitian follow up at next visit  Return in about 3 months   Advised to continue transition slowly. Adding sodium to diet. Staying hydrated/ Gatorade once daily. Continue eating every 2-3 hours. Advised to start monitoring BP and follow up with PCP.

## 2024-08-26 ENCOUNTER — TELEPHONE (OUTPATIENT)
Dept: PSYCHIATRY | Age: 24
End: 2024-08-26

## 2024-08-29 ENCOUNTER — OFFICE VISIT (OUTPATIENT)
Dept: PSYCHIATRY | Age: 24
End: 2024-08-29

## 2024-08-29 VITALS
BODY MASS INDEX: 22.76 KG/M2 | HEIGHT: 70 IN | WEIGHT: 159 LBS | OXYGEN SATURATION: 98 % | HEART RATE: 77 BPM | DIASTOLIC BLOOD PRESSURE: 58 MMHG | SYSTOLIC BLOOD PRESSURE: 104 MMHG

## 2024-08-29 DIAGNOSIS — F41.9 ANXIETY: ICD-10-CM

## 2024-08-29 DIAGNOSIS — F90.2 ATTENTION DEFICIT HYPERACTIVITY DISORDER (ADHD), COMBINED TYPE: ICD-10-CM

## 2024-08-29 DIAGNOSIS — R42 DIZZINESS: ICD-10-CM

## 2024-08-29 DIAGNOSIS — F33.1 MODERATE EPISODE OF RECURRENT MAJOR DEPRESSIVE DISORDER (HCC): Primary | ICD-10-CM

## 2024-08-29 ASSESSMENT — ANXIETY QUESTIONNAIRES
2. NOT BEING ABLE TO STOP OR CONTROL WORRYING: NOT AT ALL
6. BECOMING EASILY ANNOYED OR IRRITABLE: MORE THAN HALF THE DAYS
IF YOU CHECKED OFF ANY PROBLEMS ON THIS QUESTIONNAIRE, HOW DIFFICULT HAVE THESE PROBLEMS MADE IT FOR YOU TO DO YOUR WORK, TAKE CARE OF THINGS AT HOME, OR GET ALONG WITH OTHER PEOPLE: SOMEWHAT DIFFICULT
7. FEELING AFRAID AS IF SOMETHING AWFUL MIGHT HAPPEN: NOT AT ALL
3. WORRYING TOO MUCH ABOUT DIFFERENT THINGS: NOT AT ALL
1. FEELING NERVOUS, ANXIOUS, OR ON EDGE: NOT AT ALL
5. BEING SO RESTLESS THAT IT IS HARD TO SIT STILL: SEVERAL DAYS
4. TROUBLE RELAXING: MORE THAN HALF THE DAYS
GAD7 TOTAL SCORE: 5

## 2024-08-29 ASSESSMENT — PATIENT HEALTH QUESTIONNAIRE - PHQ9
8. MOVING OR SPEAKING SO SLOWLY THAT OTHER PEOPLE COULD HAVE NOTICED. OR THE OPPOSITE, BEING SO FIGETY OR RESTLESS THAT YOU HAVE BEEN MOVING AROUND A LOT MORE THAN USUAL: NOT AT ALL
4. FEELING TIRED OR HAVING LITTLE ENERGY: MORE THAN HALF THE DAYS
5. POOR APPETITE OR OVEREATING: SEVERAL DAYS
9. THOUGHTS THAT YOU WOULD BE BETTER OFF DEAD, OR OF HURTING YOURSELF: NOT AT ALL
7. TROUBLE CONCENTRATING ON THINGS, SUCH AS READING THE NEWSPAPER OR WATCHING TELEVISION: MORE THAN HALF THE DAYS
SUM OF ALL RESPONSES TO PHQ QUESTIONS 1-9: 7
3. TROUBLE FALLING OR STAYING ASLEEP: MORE THAN HALF THE DAYS
SUM OF ALL RESPONSES TO PHQ QUESTIONS 1-9: 7
10. IF YOU CHECKED OFF ANY PROBLEMS, HOW DIFFICULT HAVE THESE PROBLEMS MADE IT FOR YOU TO DO YOUR WORK, TAKE CARE OF THINGS AT HOME, OR GET ALONG WITH OTHER PEOPLE: SOMEWHAT DIFFICULT
SUM OF ALL RESPONSES TO PHQ9 QUESTIONS 1 & 2: 0
6. FEELING BAD ABOUT YOURSELF - OR THAT YOU ARE A FAILURE OR HAVE LET YOURSELF OR YOUR FAMILY DOWN: NOT AT ALL
2. FEELING DOWN, DEPRESSED OR HOPELESS: NOT AT ALL
1. LITTLE INTEREST OR PLEASURE IN DOING THINGS: NOT AT ALL

## 2024-09-18 LAB
ALBUMIN: 3.4 G/DL
ALP BLD-CCNC: 65 U/L
ALT SERPL-CCNC: 16 U/L
ANION GAP SERPL CALCULATED.3IONS-SCNC: 14 MMOL/L
AST SERPL-CCNC: 16 U/L
BASOPHILS ABSOLUTE: 0 /ΜL
BASOPHILS RELATIVE PERCENT: 1 %
BILIRUB SERPL-MCNC: 0.4 MG/DL (ref 0.1–1.4)
BUN BLDV-MCNC: 8 MG/DL
CALCIUM SERPL-MCNC: 9 MG/DL
CHLORIDE BLD-SCNC: 106 MMOL/L
CHOLESTEROL, TOTAL: 155 MG/DL
CHOLESTEROL/HDL RATIO: 2.5
CO2: NORMAL
CREAT SERPL-MCNC: 0.64 MG/DL
EOSINOPHILS ABSOLUTE: 0.3 /ΜL
EOSINOPHILS RELATIVE PERCENT: 7.4 %
FERRITIN: 70 NG/ML (ref 9–150)
FOLATE: 17.1
GFR, ESTIMATED: 125
GLUCOSE BLD-MCNC: 89 MG/DL
HCT VFR BLD CALC: 36.9 % (ref 36–46)
HDLC SERPL-MCNC: 62 MG/DL (ref 35–70)
HEMOGLOBIN: 12.9 G/DL (ref 12–16)
LDL CHOLESTEROL: 76
LYMPHOCYTES ABSOLUTE: 1.6 /ΜL
LYMPHOCYTES RELATIVE PERCENT: 41.4 %
MCH RBC QN AUTO: 30.5 PG
MCHC RBC AUTO-ENTMCNC: 35 G/DL
MCV RBC AUTO: 87.3 FL
MONOCYTES ABSOLUTE: 0.3 /ΜL
MONOCYTES RELATIVE PERCENT: 7.2 %
NEUTROPHILS ABSOLUTE: 1.7 /ΜL
NEUTROPHILS RELATIVE PERCENT: 43 %
NONHDLC SERPL-MCNC: 93 MG/DL
PLATELET # BLD: 280 K/ΜL
PMV BLD AUTO: 7.8 FL
POTASSIUM SERPL-SCNC: 3.9 MMOL/L
RBC # BLD: 4.23 10^6/ΜL
SODIUM BLD-SCNC: 140 MMOL/L
TOTAL PROTEIN: 6.5 G/DL (ref 6.4–8.2)
TRIGL SERPL-MCNC: 87 MG/DL
TSH SERPL DL<=0.05 MIU/L-ACNC: 1.19 UIU/ML
VITAMIN B-12: 196
VLDLC SERPL CALC-MCNC: NORMAL MG/DL
WBC # BLD: 3.9 10^3/ML

## 2024-09-19 LAB
ESTIMATED AVERAGE GLUCOSE: 100
HBA1C MFR BLD: 5.1 %
IRON % SATURATION: 29
IRON: 116
PTH INTACT: 29.5
TOTAL IRON BINDING CAPACITY: 396
VITAMIN D 25-HYDROXY: 33
VITAMIN D2, 25 HYDROXY: NORMAL
VITAMIN D3,25 HYDROXY: NORMAL

## 2024-10-10 ENCOUNTER — OFFICE VISIT (OUTPATIENT)
Dept: PSYCHIATRY | Age: 24
End: 2024-10-10

## 2024-10-10 VITALS
HEART RATE: 67 BPM | DIASTOLIC BLOOD PRESSURE: 61 MMHG | BODY MASS INDEX: 22.9 KG/M2 | OXYGEN SATURATION: 98 % | WEIGHT: 160 LBS | SYSTOLIC BLOOD PRESSURE: 104 MMHG | HEIGHT: 70 IN

## 2024-10-10 DIAGNOSIS — F41.9 ANXIETY: ICD-10-CM

## 2024-10-10 DIAGNOSIS — F90.2 ATTENTION DEFICIT HYPERACTIVITY DISORDER (ADHD), COMBINED TYPE: ICD-10-CM

## 2024-10-10 DIAGNOSIS — F33.0 MILD EPISODE OF RECURRENT MAJOR DEPRESSIVE DISORDER (HCC): Primary | ICD-10-CM

## 2024-10-10 ASSESSMENT — PATIENT HEALTH QUESTIONNAIRE - PHQ9: DEPRESSION UNABLE TO ASSESS: PT REFUSES

## 2024-10-10 NOTE — PROGRESS NOTES
[facility]  [facility]  [address]  [place] [address]  Dept: [phone]  Dept Fax: [phone]  Loc: [phone]    Visit Date: 10/10/2024    SUBJECTIVE DATA     CHIEF COMPLAINT:    Chief Complaint   Patient presents with    Depression    Medication Refill    Follow-up       History obtained from: patient    HISTORY OF PRESENT ILLNESS:    Marybeth Escalante is a 24 y.o. female who presents to the office for follow-up on complaints of ADHD and anxiety. Last appointment was 08/29/2024.    MOOD  -states \"I'm doing fantastic.\"  - endorses poor concentration   - poor motivation  - endorses intense sadness around period, states this is normal  - once a week has intense sadness  - denies hopelessness or helplessness     Notices her mood shifts significantly around her menses  -gets very depressed on her menses  -recalls as a teen she would get more angry around her menses    ANXIETY  - feelings of being overwhelmed at work  - endorses irritability, caused by stressors such as kids   - endorses feeling overwhelmed by life   - denies racing thoughts   - feels irritability is controlled   - some internal shakiness felt at times     SLEEP  - sleeps between 6-8 hours.  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   - planning on buying a house  - states two kids are on the spectrum       Denies suicidal ideations, intent, plan. No homicidal ideations, intent, plan. No audiovisual hallucinations.      HPI      PSYCHIATRIC HISTORY:  Patient has had prior care with the following:    [x] Psychiatrist ([place], saw pediatric psychiatrist)    [x] Psychologist (Dr. Deleon)    [x] Other Therapist (as a child)    [] None    The patient has had 3 lifetime suicide attempts.

## 2024-10-22 ENCOUNTER — TELEPHONE (OUTPATIENT)
Dept: PSYCHIATRY | Age: 24
End: 2024-10-22

## 2024-10-22 NOTE — TELEPHONE ENCOUNTER
If patient is agreeable would recommend restarting antidepressant first. She was previously on Prozac. If she agrees, will send Rx to pharmacy.

## 2024-10-22 NOTE — PROGRESS NOTES
Patient is a 24 y.o. female seen for follow up MNT visit for one year post op.     Vitals from current and previous visits:      10/24/2024  10:51 AM   Vitals    SYSTOLIC 98    DIASTOLIC 60    Site Right Upper Arm    Position Sitting    Cuff Size Large Adult    Pulse 88    Temp 97.7 °F (36.5 °C)    Respirations    SpO2    Weight - Scale 157 lb 9.6 oz    Height 5' 10.25\"    Body Mass Index 22.45 kg/m2    Pain Score    Pain Loc          Recent Post Op Lab Work:  One year post op labs done Sept 2024- B12 low at 196. Copper, Zinc, Selenium WNL.  Glucose WNL  Lab Results   Component Value Date/Time    VITD25 33 09/19/2024 11:34 AM       Date of Surgery: 10/2/23  Type of Surgery: gastric bypass     Weight loss starting to slow down since 9 month post op only ~ 3 lbs - will need to monitor this.   Initial Weight: 257 lbs at pre-op teaching class   Excess  Body Weight Pre-Op: 83  lbs     Weight Loss: 100 lbs.  Patient's Target Weight =  174 lbs for a BMI of ~25  Percentage of Excess Body Weight Lost to date is :  120 %    How pleased are you with your current weight loss:   States un medicated for Mental health since August as states advised to stop everything with cardiology to find cause of dizziness.    Restarted Prozac yesterday.  Has been off Vynase since August and will remain off of this med for now      Are you experiencing any physical problems post surgery:  Denies heartburn or reflux.  Is taking Colace daily- having BM every three days .  Tried benefiber powder and didn't like taste.  Pt may try smooth move tea    Are you experiencing any dietary problems post surgery: No  Food Intolerances: Denies food intolerances since last seen.    How frequently are you eating?   Eating food ~ 4-6 times a day.  Smaller meals and states grazes some in between these eating times - fruits, or nuts  How long does it take you to finish a meal? Slowing down when eating.   Admits will get dumping if eats something shouldn't eat-

## 2024-10-22 NOTE — TELEPHONE ENCOUNTER
Marybeth wrote into the office via Shoutitout:    Good morning,  So, I finally got confirmation for a cardiologist appointment. Unfortunately, it's not until February of 2025. I'm not sure that I can function that long without being medicated. I've already noticed the depression starting up (my  had to point it out to me), but my adhd is becoming unmanageable. Can I be put on a lower dose of the Vyvanse until I get evaluated, or is there something else we can try in the meantime?  Thank you.     Please advise. She is scheduled to return on 01/03/25 and sees Dr. Badillo.

## 2024-10-24 ENCOUNTER — OFFICE VISIT (OUTPATIENT)
Dept: BARIATRICS/WEIGHT MGMT | Age: 24
End: 2024-10-24

## 2024-10-24 ENCOUNTER — OFFICE VISIT (OUTPATIENT)
Dept: BARIATRICS/WEIGHT MGMT | Age: 24
End: 2024-10-24
Payer: COMMERCIAL

## 2024-10-24 VITALS
SYSTOLIC BLOOD PRESSURE: 98 MMHG | DIASTOLIC BLOOD PRESSURE: 60 MMHG | HEART RATE: 88 BPM | BODY MASS INDEX: 22.56 KG/M2 | WEIGHT: 157.6 LBS | TEMPERATURE: 97.7 F | HEIGHT: 70 IN

## 2024-10-24 DIAGNOSIS — F90.9 ATTENTION DEFICIT HYPERACTIVITY DISORDER (ADHD), UNSPECIFIED ADHD TYPE: ICD-10-CM

## 2024-10-24 DIAGNOSIS — Z98.84 STATUS POST BARIATRIC SURGERY: Primary | ICD-10-CM

## 2024-10-24 DIAGNOSIS — K91.2 POSTSURGICAL MALABSORPTION: ICD-10-CM

## 2024-10-24 DIAGNOSIS — K21.9 GASTROESOPHAGEAL REFLUX DISEASE, UNSPECIFIED WHETHER ESOPHAGITIS PRESENT: ICD-10-CM

## 2024-10-24 DIAGNOSIS — F32.A DEPRESSION, UNSPECIFIED DEPRESSION TYPE: ICD-10-CM

## 2024-10-24 DIAGNOSIS — Z13.21 SCREENING FOR MALNUTRITION: ICD-10-CM

## 2024-10-24 DIAGNOSIS — R79.89 LOW VITAMIN B12 LEVEL: ICD-10-CM

## 2024-10-24 DIAGNOSIS — Z98.84 HISTORY OF ROUX-EN-Y GASTRIC BYPASS: Primary | ICD-10-CM

## 2024-10-24 DIAGNOSIS — F41.1 GENERALIZED ANXIETY DISORDER: ICD-10-CM

## 2024-10-24 DIAGNOSIS — K21.9 GASTROESOPHAGEAL REFLUX DISEASE WITHOUT ESOPHAGITIS: ICD-10-CM

## 2024-10-24 PROBLEM — R42 LIGHTHEADEDNESS: Status: ACTIVE | Noted: 2024-10-18

## 2024-10-24 PROCEDURE — 99214 OFFICE O/P EST MOD 30 MIN: CPT | Performed by: PHYSICIAN ASSISTANT

## 2024-10-24 NOTE — PATIENT INSTRUCTIONS
Goals:  1.  Protein goal is 60-80 grams protein per day.  Remember to choose protein foods first at meals to meet this goal, followed by vegetables, then fruit, and starch food last if still hungry.  2.  Water goal is 64 oz per day.  Separate liquids from solids.  No liquids 30 minutes before meals and no liquids 30 minutes after your meals.    3.  Take 20-30 minutes to eat - this helps with mindful eating as well.  Do not skip meals and stick to consistent meal times schedule as much as possible.   4.  Physical activity remains important to maintain weight loss efforts  5.  Routine vitamin intake is important to avoid deficiencies.  Continue the followin.  Switch to one [employer] Multivitamin Daily in morning with food   2.  Continue Calcium chew in evening.    3. Add Celebrate B12 1,000 mcg cherry flavor once a day in morning  4.  Stop vitafusion hair skin and nail

## 2024-10-24 NOTE — PATIENT INSTRUCTIONS
Stay well hydrated. Drink a minimum of 64 oz of non-carbonated, non-caffeinated fluids daily.  Nutritional education occurred during visit. Tolerating diet. Continue following with dietitian and follow their recommendations as directed. Continue  60-80 grams of protein each day.    Signs and symptoms reviewed with patient that would be concerning and need her to return to office for re-evaluation. Patient will call if any questions or concerns arrise.  Importance of physical activity discussed with patient.   Increase physical activity as tolerated  Add strength training  Continue taking Multivitamin and Calcium   Continue Omeprazole prn  Encouraged to attend support groups  Annual labs reviewed with patient today  B12 low at 196- supplement advised. Repeat level in 3 months.   SECA scale completed and reviewed with patient today  Measurements completed and reviewed with patient today  Follow up with [facility] as scheduled

## 2024-10-24 NOTE — PROGRESS NOTES
of EBW lost: >100%      Past Medical History:  Past Medical History:   Diagnosis Date    Anxiety     Arthritis     hips and knees bilat    Depression     Diabetes mellitus (HCC)     GERD (gastroesophageal reflux disease)     Headache     Insulin resistance     CB (obstructive sleep apnea)     improved since weight loss surgery     Polycystic ovarian disease     PONV (postoperative nausea and vomiting)     Urinary incontinence        Past Surgical History:  Past Surgical History:   Procedure Laterality Date    ADENOIDECTOMY      CHOLECYSTECTOMY      DILATION AND CURETTAGE OF UTERUS      OTHER SURGICAL HISTORY      lymph node/fatty deposit removal     STANLEY-EN-Y GASTRIC BYPASS N/A 10/2/2023    ROBOTIC STANLEY-EN-Y GASTRIC BYPASS performed by Aman Ellington MD at [facility] OR    TONSILLECTOMY         Past Social History:  Social History     Socioeconomic History    Marital status:      Spouse name: Not on file    Number of children: 2    Years of education: Not on file    Highest education level: Some college, no degree   Occupational History    Not on file   Tobacco Use    Smoking status: Former     Current packs/day: 0.00     Types: Cigarettes     Quit date: 2017     Years since quittin.4    Smokeless tobacco: Never    Tobacco comments:     Pt smoked socially - Stopped smoking socially    Vaping Use    Vaping status: Former    Substances: Nicotine, Flavoring    Devices: Disposable   Substance and Sexual Activity    Alcohol use: Yes     Comment: rare    Drug use: Not Currently     Types: Marijuana (Weed)    Sexual activity: Yes     Partners: Male   Other Topics Concern    Not on file   Social History Narrative    10/10/2024    LEVEL OF EDUCATION: graduated high school; currently enrolled in [employer] for [employer] - will be completed in     SPECIAL EDUCATION NEEDS: speech therapy; was in a gifted class    RESIDENCE: Currently lives with her fiance and her 2 children     LEGAL

## 2024-11-18 NOTE — TELEPHONE ENCOUNTER
[facility] Escalante pharmacy is requesting a refill on the following medications:  Requested Prescriptions     Pending Prescriptions Disp Refills    FLUoxetine (PROZAC) 20 MG capsule [Pharmacy Med Name: FLUoxetine HCl 20 MG Oral Capsule] 30 capsule 1     Sig: Take 1 capsule by mouth once daily       Date of last visit: 10/10/2024  Date of next visit (if applicable):1/3/2025  Pharmacy Name: Jing Camarillo MA